# Patient Record
Sex: FEMALE | Race: WHITE | NOT HISPANIC OR LATINO | Employment: UNEMPLOYED | URBAN - METROPOLITAN AREA
[De-identification: names, ages, dates, MRNs, and addresses within clinical notes are randomized per-mention and may not be internally consistent; named-entity substitution may affect disease eponyms.]

---

## 2018-02-08 ENCOUNTER — TRANSFERRED RECORDS (OUTPATIENT)
Dept: HEALTH INFORMATION MANAGEMENT | Facility: CLINIC | Age: 23
End: 2018-02-08

## 2018-02-18 DIAGNOSIS — H53.10 SUBJECTIVE VISUAL DISTURBANCE: Primary | ICD-10-CM

## 2018-02-22 ENCOUNTER — OFFICE VISIT (OUTPATIENT)
Dept: OPHTHALMOLOGY | Facility: CLINIC | Age: 23
End: 2018-02-22
Attending: OPHTHALMOLOGY
Payer: COMMERCIAL

## 2018-02-22 DIAGNOSIS — H53.40 VISUAL FIELD DEFECT: Primary | ICD-10-CM

## 2018-02-22 DIAGNOSIS — H53.10 SUBJECTIVE VISUAL DISTURBANCE: ICD-10-CM

## 2018-02-22 PROCEDURE — 92133 CPTRZD OPH DX IMG PST SGM ON: CPT | Mod: ZF | Performed by: OPHTHALMOLOGY

## 2018-02-22 PROCEDURE — 92083 EXTENDED VISUAL FIELD XM: CPT | Mod: ZF | Performed by: OPHTHALMOLOGY

## 2018-02-22 PROCEDURE — G0463 HOSPITAL OUTPT CLINIC VISIT: HCPCS | Mod: ZF

## 2018-02-22 RX ORDER — NORGESTIMATE AND ETHINYL ESTRADIOL 0.25-0.035
1 KIT ORAL DAILY
COMMUNITY

## 2018-02-22 ASSESSMENT — CONF VISUAL FIELD
OS_NORMAL: 1
OD_NORMAL: 1

## 2018-02-22 ASSESSMENT — VISUAL ACUITY
OS_SC+: -1
OD_SC: 20/15
OS_SC: 20/15
METHOD: SNELLEN - LINEAR

## 2018-02-22 ASSESSMENT — SLIT LAMP EXAM - LIDS
COMMENTS: NORMAL
COMMENTS: NORMAL

## 2018-02-22 ASSESSMENT — CUP TO DISC RATIO
OD_RATIO: 0.3
OS_RATIO: 0.3

## 2018-02-22 ASSESSMENT — TONOMETRY
IOP_METHOD: TONOPEN
OD_IOP_MMHG: 15
OS_IOP_MMHG: 12

## 2018-02-22 ASSESSMENT — EXTERNAL EXAM - LEFT EYE: OS_EXAM: NORMAL

## 2018-02-22 ASSESSMENT — EXTERNAL EXAM - RIGHT EYE: OD_EXAM: NORMAL

## 2018-02-22 NOTE — NURSING NOTE
Chief Complaints and History of Present Illnesses   Patient presents with     Consult For     scotoma, left eye - referred by Dr. Richardson ([facility])     HPI    Affected eye(s):  Left   Symptoms:     No decreased vision   No double vision   No flashes         Do you have eye pain now?:  No      Comments:  Pt here for consult of a scotoma in the left  Pt describes this as a small Wilton-shaped blind spot in the left eye - constant in the left eye  Pt states she occasionally notes some little circular spots in the right eye but not as frequent  Pt reports her vision is very good at distance and near - no changes and wears no correction    Gail Benitez COA 7:41 AM February 22, 2018

## 2018-02-22 NOTE — LETTER
2018    Cesar Richardson MD    RE: Tiffanie Restrepo  : 1995  MRN: [id number]    Dear Dr. Richardson:    Thank you for referring your patient, Tiffanie Restrepo, to my neuro-ophthalmology clinic recently.  After a thorough neuro-ophthalmic history and examination, I came to the following conclusions:     1. Subjective visual disturbance-central scotoma left eye. Normal neuro-ophthalmologic exam today.  No evidence of optic neuropathy nor maculopathy as etiology.     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 She was referred by Dr. Richardson with the [facility] practice.  He did not find a retinal etiology and was referring to evaluate for an optic neuropathy and/or consideration of electrodiagnostic testing. Present x 1 year. States that it can fluctuate in size throughout the day.  She states that it follows with her vision, does not lag behind it. It is present approximately 60-80% time throughout the day. She is able to draw the scotoma on an amsler grid which was paracentral and temporal in location. She denies photopsias. Denies numbness, tingling, diplopia. She does have left-sided headache which last 1-2 hrs. She has them approximately once every few months. She denies previous trauma. Denies any previous viral prodrome.  She also notes two episodes of syncopal episodes, once several months ago while in the shower. Previously occurred when 18 years old. ECG both normal. No holter monitoring. No additional outpatient workup. Denies family history of autoimmune disorders.     Today her visual acuity is normal, color vision is normal, fields are full to confrontation. She does not have an afferent pupillary defect on my check. Her slit lamp exam is normal. Dilated fundus exam is significant for vitreous syneresis bilaterally. 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  Cranial nerves V1-3, 7,8,9,11, and 12 were normal bilaterally.     Octopus 30-2  "automated static perimetry testing was normal both eyes. Octopus 10-2 automated static perimetry of the left eye demonstrated nonspecific superotemporal and inferonasal defect which did not match up to the location of her subjective \"spot\" on Amsler grid testing. Spectralis OCT macula and retinal nerve fiber layer was normal, including detailed scan images of both macula.    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 I can not identify a cause; specifically, I see no indication of an optic neuropathy nor maculopathy.  It is reassuring that there has not been any significant progression in symptoms over the past 1 year except perhaps that the spot appears a little more frequently- it is not visually disabling.      If it were to progress then we could consider a multifocal electroretinogram; however, in the absence of any retinal abnormalities on spectralis OCT, I think the yield would be very low for this.  Furthermore, if there were pinpoint retinal cone dysfunction in the left eye on multifocal electroretinogram that has been subjectively stable for the past year I don't think we would pursue any additional work-up nor would there be any treatment.  Recommend observation for now.    Again, thank you for trusting me with the care of your patient.  For further exam details, please feel free to contact our office for additional records.  If you wish to contact me regarding this patient please email me at [email or web address] or give my clinic a call to arrange a phone conversation.    Sincerely,    Srinivasa Redd MD  , Neuro-Ophthalmology and Adult Strabismus  Department of Ophthalmology and Visual Neurosciences  [facility]    DX: subjective visual disturbance, paracentral scotoma of unclear etiology      "

## 2018-02-22 NOTE — MR AVS SNAPSHOT
After Visit Summary   2018    Tiffanie Restrepo    MRN: [id number]           Patient Information     Date Of Birth          1995        Visit Information        Provider Department      2018 7:30 AM Srinivasa Redd MD [facility]        Today's Diagnoses     Visual field defect - Left Eye    -  1    Subjective visual disturbance           Follow-ups after your visit        Who to contact     Please call your clinic at [phone] to:    Ask questions about your health    Make or cancel appointments    Discuss your medicines    Learn about your test results    Speak to your doctor            Additional Information About Your Visit        MyChart Information     [employer] is an electronic gateway that provides easy, online access to your medical records. With [employer], you can request a clinic appointment, read your test results, renew a prescription or communicate with your care team.     To sign up for [employer] visit the website at [email or web address]   You will be asked to enter the access code listed below, as well as some personal information. Please follow the directions to create your username and password.     Your access code is: [email or web address]  Expires: 5/10/2018  6:30 AM     Your access code will  in 90 days. If you need help or a new code, please contact your [facility] or call [phone] for assistance.        Care EveryWhere ID     This is your Care EveryWhere ID. This could be used by other organizations to access your [facility] medical records  [id number]         Blood Pressure from Last 3 Encounters:   No data found for BP    Weight from Last 3 Encounters:   No data found for Wt              We Performed the Following     Glaucoma Top OU     OCT Optic Nerve RNFL Spectralis OU (both eyes)        Primary Care Provider    None Specified       No primary provider on file.        Equal Access to Services     JELANI NICOLE :  Hadii yung rich Sotoñoali, walexda luqadaha, qaybta kaalaleksandar navarro, ramses kaydenin hayaanicola martíneznida gutierres layaronnicola shahriar. So [facility] [phone].    ATENCIÓN: Si habla antwan, tiene a herman disposición servicios gratuitos de asistencia lingüística. Llame al [phone].    We comply with applicable federal civil rights laws and [place] laws. We do not discriminate on the basis of race, color, national origin, age, disability, sex, sexual orientation, or gender identity.            Thank you!     Thank you for choosing [facility]  for your care. Our goal is always to provide you with excellent care. Hearing back from our patients is one way we can continue to improve our services. Please take a few minutes to complete the written survey that you may receive in the mail after your visit with us. Thank you!             Your Updated Medication List - Protect others around you: Learn how to safely use, store and throw away your medicines at www.disposemymeds.org.          This list is accurate as of 2/22/18  9:16 PM.  Always use your most recent med list.                   Brand Name Dispense Instructions for use Diagnosis    norgestimate-ethinyl estradiol 0.25-35 MG-MCG per tablet    ORTHO-CYCLEN, SPRINTEC     Take 1 tablet by mouth daily

## 2018-05-24 ENCOUNTER — TELEPHONE (OUTPATIENT)
Dept: INTERNAL MEDICINE | Facility: CLINIC | Age: 23
End: 2018-05-24

## 2018-05-24 NOTE — TELEPHONE ENCOUNTER
"[facility]: Nurse Triage Note  SITUATION/BACKGROUND                                                      Tiffanie Restrepo is a 22 year old female who calls with  Gradual change in vision since Dr Redd visit 2/22/2018, left subjective scotoma noted.  Plan @ that time, excerpted from Dr Redd's note:\"Subjective visual disturbance-central scotoma left eye. Normal neuro-ophthalmologic exam today.  No evidence of optic neuropathy nor maculopathy as etiology.\" plan:  \"we could consider a multifocal electroretinogram however in the absence of any retinal abnormalities on spectralis OCT I think the yield would be very low for this. \"  Description:   bilaterally sees dots iwhich states are \" not like floaters as they stay in same spot 75 % of the time, and sometimes look like a bullseye\"  Previously this was confined to \"1-2 \"dots\" now 2-3 bilaterally.    Onset/duration:  Gradual since February, wanted to report change and see what is next step.  No eye pain, nor drainage. No blood, irritation in eye and no recent injury. Does review previous head trauma x 2 of 1-5 years ago.  No field cut, light flashes or curtains in visual field.  No medications, wished to discuss changes and next step.        Allergies:   Allergies   Allergen Reactions     Cefdinir      Fluoxetine      Sulfa Drugs        ASSESSMENT      Non-acute changes in vision, will ask Ophth team to review and schedule accordingly.She understands.    RECOMMENDATION/PLAN                                                      RECOMMENDED DISPOSITION:  Phone Visit  Will comply with recommendation: Yes    If further questions/concerns or if symptoms do not improve, worsen or new symptoms develop, call your PCP or [phone] to talk with the Resident on call, as soon as possible.    Guideline used: pp 639 vision problems  Telephone Triage Protocols for Nurses, Fifth Edition, Laila Grossman RN  "

## 2018-05-24 NOTE — TELEPHONE ENCOUNTER
Worsening left eye vision-- dots are more larger in size over past 2-3 months   Starting see dots in right eye since before last eye exam-- not changing    Dots in vision stationary-- not floating    H/o one right eye floater than moves over one year     No eye pain  No redness  Eyes dry  Visual acuity unchanged    Pt noted one dot starting to effect part of words past couple weeks when reading in left eye     Will forward to dr. De

## 2018-06-26 DIAGNOSIS — H53.10 SUBJECTIVE VISUAL DISTURBANCE: Primary | ICD-10-CM

## 2018-06-27 ENCOUNTER — OFFICE VISIT (OUTPATIENT)
Dept: OPHTHALMOLOGY | Facility: CLINIC | Age: 23
End: 2018-06-27
Attending: OPHTHALMOLOGY
Payer: COMMERCIAL

## 2018-06-27 DIAGNOSIS — H53.40 VISUAL FIELD DEFECT: Primary | ICD-10-CM

## 2018-06-27 DIAGNOSIS — H53.10 SUBJECTIVE VISUAL DISTURBANCE: ICD-10-CM

## 2018-06-27 PROCEDURE — 92134 CPTRZ OPH DX IMG PST SGM RTA: CPT | Mod: ZF | Performed by: OPHTHALMOLOGY

## 2018-06-27 PROCEDURE — 92082 INTERMEDIATE VISUAL FIELD XM: CPT | Mod: ZF | Performed by: OPHTHALMOLOGY

## 2018-06-27 PROCEDURE — G0463 HOSPITAL OUTPT CLINIC VISIT: HCPCS | Mod: ZF | Performed by: TECHNICIAN/TECHNOLOGIST

## 2018-06-27 ASSESSMENT — VISUAL ACUITY
METHOD: SNELLEN - LINEAR
OS_SC: 20/20
OD_SC: 20/20

## 2018-06-27 ASSESSMENT — SLIT LAMP EXAM - LIDS
COMMENTS: NORMAL
COMMENTS: NORMAL

## 2018-06-27 ASSESSMENT — EXTERNAL EXAM - RIGHT EYE: OD_EXAM: NORMAL

## 2018-06-27 ASSESSMENT — TONOMETRY
OS_IOP_MMHG: 17
IOP_METHOD: ICARE
OD_IOP_MMHG: 15

## 2018-06-27 ASSESSMENT — CUP TO DISC RATIO
OS_RATIO: 0.3
OD_RATIO: 0.3

## 2018-06-27 ASSESSMENT — EXTERNAL EXAM - LEFT EYE: OS_EXAM: NORMAL

## 2018-06-27 NOTE — MR AVS SNAPSHOT
After Visit Summary   2018    Tiffanie Restrepo    MRN: [id number]           Patient Information     Date Of Birth          1995        Visit Information        Provider Department      2018 9:15 AM Srinivasa Redd MD [facility]        Today's Diagnoses     Visual field defect - Left Eye    -  1    Subjective visual disturbance           Follow-ups after your visit        Who to contact     Please call your clinic at [phone] to:    Ask questions about your health    Make or cancel appointments    Discuss your medicines    Learn about your test results    Speak to your doctor            Additional Information About Your Visit        MyChart Information     [employer] is an electronic gateway that provides easy, online access to your medical records. With [employer], you can request a clinic appointment, read your test results, renew a prescription or communicate with your care team.     To sign up for [employer] visit the website at [email or web address]   You will be asked to enter the access code listed below, as well as some personal information. Please follow the directions to create your username and password.     Your access code is: [email or web address]  Expires: 2018  6:30 AM     Your access code will  in 90 days. If you need help or a new code, please contact your [facility] or call [phone] for assistance.        Care EveryWhere ID     This is your Care EveryWhere ID. This could be used by other organizations to access your [facility] medical records  [id number]         Blood Pressure from Last 3 Encounters:   No data found for BP    Weight from Last 3 Encounters:   No data found for Wt              We Performed the Following     IOP Measurement     OCT Retina Spectralis OU (both eyes)     OVF 10-2 Macula OU        Primary Care Provider Fax #    Physician No Ref-Primary [phone]       No address on file        Equal Access to  Services     [facility]: Hadii aad ku hadwilfridoshania Lissettekiesha, walexda luqadaha, qaybta kaalmaearl navarro, ramses bess. So [facility] [phone].    ATENCIÓN: Si habla español, tiene a herman disposición servicios gratuitos de asistencia lingüística. Llame al [phone].    We comply with applicable federal civil rights laws and [place] laws. We do not discriminate on the basis of race, color, national origin, age, disability, sex, sexual orientation, or gender identity.            Thank you!     Thank you for choosing [facility]  for your care. Our goal is always to provide you with excellent care. Hearing back from our patients is one way we can continue to improve our services. Please take a few minutes to complete the written survey that you may receive in the mail after your visit with us. Thank you!             Your Updated Medication List - Protect others around you: Learn how to safely use, store and throw away your medicines at www.disposemymeds.org.          This list is accurate as of 6/27/18  5:39 PM.  Always use your most recent med list.                   Brand Name Dispense Instructions for use Diagnosis    norgestimate-ethinyl estradiol 0.25-35 MG-MCG per tablet    ORTHO-CYCLEN, SPRINTEC     Take 1 tablet by mouth daily

## 2018-06-27 NOTE — NURSING NOTE
"Chief Complaints and History of Present Illnesses   Patient presents with     Follow Up For     subjective visual disturbance, visual field defect     HPI    Symptoms:              Comments:  Circles/spots in left eye vision. Patient states spots that are \"gray\" that happens everyday, over half of the time per day. Patient feels some are floaters but one isn't as it move whenever she moves her eyes.     Vision stable per patient, sometimes blurry ? To [place] per patient.     MARBELLA Falcon 6/27/2018 9:52 AM                 "

## 2018-06-27 NOTE — LETTER
2018    RE: Tiffanie Restrepo  : 1995  MRN: [id number]    Dear Providers,    I saw our mutual patient, Tiffanie Restrepo, in follow-up in my clinic recently.  After a thorough neuro-ophthalmic history and examination, I came to the following conclusions:       1. Subjective visual disturbance-central scotoma left eye. Normal neuro-ophthalmologic exam today.  No evidence of optic neuropathy nor maculopathy as etiology. Observe  2. Probable bilateral vitreous floaters- no evidence of retinal tears / breaks / detachments    Last time she was seen by Dr. Redd in 2018. 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 Richardson with the [facility] practice.  He did not find a retinal etiology and was referring to evaluate for an optic neuropathy and/or consideration of electrodiagnostic testing. Present x 1 year. States that it can fluctuate in size throughout the day.  She states that it follows with her vision, does not lag behind it. She is able to draw the scotoma on an amsler grid which was paracentral and just superotemporal to fixation in location.  She has them approximately once every few months. She denies previous trauma. Denies any previous viral prodrome.      She also notes two episodes of syncopal episodes, once when in the shower several months ago while in the shower. Previously occurred when 18 years old. ECG both normal. No holter monitoring. No additional outpatient workup. Denies family history of autoimmune disorders.     Patient reports that gray spot just superior and temporal to fixation is more bothersome in the left eye now.  She noticed it more and it is larger at times.  She also has other spots that float in vision in both eyes and are semitransparent.  No associated headaches.  Denies numbness, tingling, diplopia. She does have left sided headache which last 1-2 hrs. She denies photopsias.     The patient  has normal afferent visual function in both eyes including normal best corrected visual acuity, color vision, confrontation visual fields, and pupillary light responses in both eyes. her structural eye exam including slit lamp exam and dilated fundus exam was normal in both eyes with the exception of mild vitreous syneresis in both eyes.  Cranial nerves V1-3, 7,8,9,11, and 12 were normal bilaterally.     Octopus 10-2 automated static perimetry in both eyes was full. Spectralis OCT macula and retinal nerve fiber layer was normal, including detailed scan images of both macula.    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  I don't have an explanation but I reassured the patient that there is no evidence of maculopathy, optic neuropathy, nor any significant visual field loss.    Recommend observation for now.    I did not make a follow-up appointment, but I would be happy to see the patient back in the future should any new neuro-ophthalmic concern arise.        For further exam details, please feel free to contact our office for additional records.  If you wish to contact me regarding this patient please email me at [email or web address] or give my clinic a call to arrange a phone conversation.    Sincerely,    Srinivasa Redd MD  , Neuro-Ophthalmology and Adult Strabismus  Department of Ophthalmology and Visual Neurosciences  [facility]

## 2018-06-27 NOTE — PROGRESS NOTES
1. Subjective visual disturbance-central scotoma left eye. Normal neuro-ophthalmologic exam today.  No evidence of optic neuropathy nor maculopathy as etiology. Observe  2. Probable bilateral vitreous floaters- no evidence of retinal tears / breaks / detachments    Last time she was seen by Dr. Redd in February 2018. 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 Richardson with the [facility] practice.  He did not find a retinal etiology and was referring to evaluate for an optic neuropathy and/or consideration of electrodiagnostic testing. Present x 1 year. States that it can fluctuate in size throughout the day.  She states that it follows with her vision, does not lag behind it. She is able to draw the scotoma on an amsler grid which was paracentral and just superotemporal to fixation in location.  She has them approximately once every few months. She denies previous trauma. Denies any previous viral prodrome.      She also notes two episodes of syncopal episodes, once when in the shower several months ago while in the shower. Previously occurred when 18 years old. ECG both normal. No holter monitoring. No additional outpatient workup. Denies family history of autoimmune disorders.     Patient reports that gray spot just superior and temporal to fixation is more bothersome in the left eye now.  She noticed it more and it is larger at times.  She also has other spots that float in vision in both eyes and are semitransparent.  No associated headaches.  Denies numbness, tingling, diplopia. She does have left sided headache which last 1-2 hrs. She denies photopsias.     The patient has normal afferent visual function in both eyes including normal best corrected visual acuity, color vision, confrontation visual fields, and pupillary light responses in both eyes. her structural eye exam including slit lamp exam and dilated fundus exam was normal in  both eyes with the exception of mild vitreous syneresis in both eyes.  Cranial nerves V1-3, 7,8,9,11, and 12 were normal bilaterally.     Octopus 10-2 automated static perimetry in both eyes was full. Spectralis OCT macula and retinal nerve fiber layer was normal, including detailed scan images of both macula.    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  I don't have an explanation but I reassured the patient that there is no evidence of maculopathy, optic neuropathy, nor any significant visual field loss.    Recommend observation for now.    I did not make a follow-up appointment, but I would be happy to see the patient back in the future should any new neuro-ophthalmic concern arise.         Complete documentation of historical and exam elements from today's encounter can be found in the full encounter summary report (not reduplicated in this progress note).  I personally obtained the chief complaint(s) and history of present illness.  I confirmed and edited as necessary the review of systems, past medical/surgical history, family history, social history, and examination findings as documented by others; and I examined the patient myself.  I personally reviewed the relevant tests, images, and reports as documented above.  I formulated and edited as necessary the assessment and plan and discussed the findings and management plan with the patient and family.  I personally reviewed the ophthalmic test(s) associated with this encounter, agree with the interpretation(s) as documented by the resident/fellow, and have edited the corresponding report(s) as necessary.     MD Tyler العراقيannam  Neurology resident   Pager: [phone]

## 2019-11-04 NOTE — PROGRESS NOTES
"     1. Subjective visual disturbance-central scotoma left eye. Normal neuro-ophthalmologic exam today.  No evidence of optic neuropathy nor maculopathy as etiology.     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 Richardson with the [facility] practice.  He did not find a retinal etiology and was referring to evaluate for an optic neuropathy and/or consideration of electrodiagnostic testing. Present x 1 year. States that it can fluctuate in size throughout the day.  She states that it follows with her vision, does not lag behind it. It is present approximately 60-80% time throughout the day. She is able to draw the scotoma on an amsler grid which was paracentral and temporal in location. She denies photopsias. Denies numbness, tingling, diplopia. She does have left sided headache which last 1-2 hrs. She has them approximately once every few months. She denies previous trauma. Denies any previous viral prodrome.  She also notes two episodes of syncopal episodes, once when in the shower several months ago while in the shower. Previously occurred when 18 years old. ECG both normal. No holter monitoring. No additional outpatient workup. Denies family history of autoimmune disorders.     Today her visual acuity is normal, color vision is normal, fields are full to confrontation. She does not have an afferent pupillary defect on my check. Her slit lamp exam is normal. Dilated fundus exam is significant for vitreous syneresis bilaterally. 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  Cranial nerves V1-3, 7,8,9,11, and 12 were normal bilaterally.     Octopus 30-2 automated static perimetry testing was normal both eyes. Octopus 10-2 automated static perimetry of the left eye demonstrated nonspecific superotemporal and inferonasal defect which did not match up to the location of her subjective \"spot\" on Amsler grid testing. Spectralis OCT macula " and retinal nerve fiber layer was normal, including detailed scan images of both macula.    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 I can not identify a cause- specifically I see no indication of an optic neuropathy nor maculopathy.  It is reassuring that there has not been any significant progression in symptoms over the past 1 year except perhaps that the spot appears a little more frequently- it is not visually disabling.      If it were to progress then we could consider a multifocal electroretinogram however in the absence of any retinal abnormalities on spectralis OCT I think the yield would be very low for this.  Furthermore if there were pinpoint retinal cone dysfunction in the left eye on multifocal electroretinogram that has been subjectively stable for the past year I don't think we would pursue any additional work-up nor would there be any treatment.  Recommend observation for now.       I spent a total of 60 minutes face to face with Tiffanie Restrepo during today's office visit.  Over 50% of this time was spent counseling the patient and/or coordinating care regarding her subjective visual disturbance.    Complete documentation of historical and exam elements from today's encounter can be found in the full encounter summary report (not reduplicated in this progress note).  I personally obtained the chief complaint(s) and history of present illness.  I confirmed and edited as necessary the review of systems, past medical/surgical history, family history, social history, and examination findings as documented by others; and I examined the patient myself.  I personally reviewed the relevant tests, images, and reports as documented above.  I formulated and edited as necessary the assessment and plan and discussed the findings and management plan with the patient and family.  I personally reviewed the ophthalmic test(s) associated with this encounter, agree  with the interpretation(s) as documented by the resident/fellow, and have edited the corresponding report(s) as necessary.     Srinivasa Redd MD         Statement Selected

## 2020-03-05 ENCOUNTER — APPOINTMENT (OUTPATIENT)
Dept: GENERAL RADIOLOGY | Facility: CLINIC | Age: 25
End: 2020-03-05
Attending: EMERGENCY MEDICINE
Payer: OTHER MISCELLANEOUS

## 2020-03-05 ENCOUNTER — HOSPITAL ENCOUNTER (EMERGENCY)
Facility: CLINIC | Age: 25
Discharge: HOME | End: 2020-03-05
Attending: EMERGENCY MEDICINE | Admitting: EMERGENCY MEDICINE
Payer: OTHER MISCELLANEOUS

## 2020-03-05 VITALS
OXYGEN SATURATION: 100 % | DIASTOLIC BLOOD PRESSURE: 85 MMHG | RESPIRATION RATE: 16 BRPM | HEART RATE: 74 BPM | TEMPERATURE: 97.7 F | SYSTOLIC BLOOD PRESSURE: 125 MMHG

## 2020-03-05 DIAGNOSIS — S61.211A LACERATION OF LEFT INDEX FINGER WITHOUT FOREIGN BODY WITHOUT DAMAGE TO NAIL, INITIAL ENCOUNTER: ICD-10-CM

## 2020-03-05 PROCEDURE — 12001 RPR S/N/AX/GEN/TRNK 2.5CM/<: CPT

## 2020-03-05 PROCEDURE — 99284 EMERGENCY DEPT VISIT MOD MDM: CPT

## 2020-03-05 PROCEDURE — 73140 X-RAY EXAM OF FINGER(S): CPT | Mod: LT

## 2020-03-05 ASSESSMENT — ENCOUNTER SYMPTOMS: WOUND: 1

## 2020-03-05 NOTE — ED AVS SNAPSHOT
Emergency Department  [address] [facility] [address]  Phone:  [phone]  Fax:  [phone]                                    Tiffanie Restrepo   MRN: [id number]    Department:   Emergency Department   Date of Visit:  3/5/2020           After Visit Summary Signature Page    I have received my discharge instructions, and my questions have been answered. I have discussed any challenges I see with this plan with the nurse or doctor.    ..........................................................................................................................................  Patient/Patient Representative Signature      ..........................................................................................................................................  Patient Representative Print Name and Relationship to Patient    ..................................................               ................................................  Date                                   Time    ..........................................................................................................................................  Reviewed by Signature/Title    ...................................................              ..............................................  Date                                               Time          22EPIC Rev 08/18

## 2020-03-05 NOTE — ED PROVIDER NOTES
History     Chief Complaint:  Laceration     HPI   Tiffanie Restrepo is a right handed 24 year old female who presents with laceration. The patient reports that today she was at work and she was using a saw when she accidentally cut her left pointer finger. The patient has since been experiencing slight nonradiating pain to the laceration and has taken 1000 mg of ibuprofen. She denies other injuries or difficulty moving the finger. The patient's boss drove her to the emergency department.   No numbness or weakness.    Allergies:  Duloxetine   Fluxoetine   Sulfa Drugs   Cefdinir      Medications:    norgestimate-ethinyl estradiol     Past Medical History:    Medical history reviewed. No pertinent medical history.      Past Surgical History:    adenoidectomy   Shoulder surgery  Sinus surgery  Tonsillectomy     Family History:    Father: glaucoma, cataracts     Social History:  This is a work related injury.   The patient makes frames for a living.     Review of Systems   Constitutional: Negative.    Skin: Positive for wound.     Physical Exam     Patient Vitals for the past 24 hrs:   BP Temp Temp src Pulse Resp SpO2   03/05/20 1247 134/85 97.7  F (36.5  C) Tympanic 74 16 100 %      Physical Exam   General: Nontoxic-appearing woman sitting upright in Fast[employer] 2  CV: no active bleeding, extremities well perfused, normal cap refill in all fingers of left hand  Resp: normal respiratory effort  MSK: no surrounding bony tenderness, full range of motion of all joints in left second finger  Skin: 1.5 cm slightly curved laceration to palmar surface of left second finger primarily overlying the PIP joint, no visible bone or tendon or foreign body  Neuro: alert, surrounding sensation and motor function intact  Psych: cooperative      Emergency Department Course     Imaging:  Radiology findings were communicated with the patient who voiced understanding of the findings.    Fingers XR, 2-3 views, left  Unremarkable exam.  LARISSA  MD ANISHA  Reading per radiology      Procedures:    Procedure Note: Laceration Repair   Performed by: Miguelito Mayo MD    Verbal consent given by patient who confirms understanding of the procedure being performed after discussing the risks, benefits and alternatives.    Preparation: Patient was prepped and draped in usual sterile fashion, with assistance from ERT.  Irrigation solution: saline    Body area: L 2nd finger  Laceration length: 1.5 cm  Contamination: The wound is not grossly contaminated.  Foreign bodies: none  Tendon involvement: none apparent  Anesthesia:  Digital block  Local anesthetic: Bupivacaine 0.5% with epinephrine    Debridement: none   Skin closure: Closed with 4 x 5.0 Ethilon   Technique: interrupted  Approximation: close  Approximation difficulty: moderate, due to location overlying joint    Patient tolerated the procedure well with no immediate complications.    Emergency Department Course:    1253 Nursing notes and vitals reviewed. I performed an exam of the patient as documented above.     1313 The patient was sent for a XR while in the emergency department, results above.      1350 Patient rechecked and updated.      1440 Procedure started.     1455 Prior to discharge, I personally reviewed the results with the patient and all related questions were answered. The patient verbalized understanding and is amenable to plan.     Impression & Plan      Medical Decision Making:  While there are no hard signs or symptoms suggestive of tendon injury or neurovascular compromise, I did make it clear to the patient that it is possible she sustained occult tendon injury, and if she experiences any difficulties with finger function or wound healing, then referral to an orthopedic hand specialist would be indicated, which was specifically discussed with her.  Good wound edge approximation and hemostasis was maintained with sutures provided, which should be removed in about 10 to 14 days  through clinic.  No evidence of fracture, dislocation, or any gross contamination.  Prophylactic antibiotics were not indicated.  Return for sudden worsening in any time.  An AlumaFoam splint was placed to provide soft tissue rest to minimize the risk of dehiscence given the location of this laceration over the joint.  She expressed understanding of and satisfaction with this plan, expressing familiarity with it due to a number of prior lacerations.    Diagnosis:    ICD-10-CM    1. Laceration of left index finger without foreign body without damage to nail, initial encounter S61.211A      Disposition:   The patient is discharged to home.     Discharge Medications:  No discharge medications.     Scribe Disclosure:  I, Orla Severson, am serving as a scribe at 1:10 PM on 3/5/2020 to document services personally performed by Miguelito Mayo MD based on my observations and the provider's statements to me.   EMERGENCY DEPARTMENT    This record was created at least in part using electronic voice recognition software, so please excuse any typographical errors.        Miguelito Mayo MD  03/06/20 1138

## 2020-03-06 ASSESSMENT — ENCOUNTER SYMPTOMS: CONSTITUTIONAL NEGATIVE: 1

## 2023-04-27 ENCOUNTER — TRANSFERRED RECORDS (OUTPATIENT)
Dept: HEALTH INFORMATION MANAGEMENT | Facility: CLINIC | Age: 28
End: 2023-04-27

## 2023-05-23 ENCOUNTER — TRANSFERRED RECORDS (OUTPATIENT)
Dept: HEALTH INFORMATION MANAGEMENT | Facility: CLINIC | Age: 28
End: 2023-05-23
Payer: COMMERCIAL

## 2023-05-30 ENCOUNTER — TELEPHONE (OUTPATIENT)
Dept: ORTHOPEDICS | Facility: CLINIC | Age: 28
End: 2023-05-30
Payer: COMMERCIAL

## 2023-06-10 NOTE — TELEPHONE ENCOUNTER
DIAGNOSIS: left knee pain / x-rays / Dr. Pop - [facility] /pt bringing CD with Images to appt   APPOINTMENT DATE: 6/13/23   NOTES STATUS DETAILS   OFFICE NOTE from referring provider Received  5/23/23 - Bry Pop MD - [facility]   OFFICE NOTE from other specialist Care Everywhere (scan in media tab)  5/1/23 - [facility] - Dr. Colorado    MEDICATION LIST Care Everywhere    LABS     XRAYS PACS [facility]:  04/26/2023 - Bilateral Knee   MRI PACS [facility]:  4/27/23 - MR KNEE LEFT -(Request sent to [facility])     4/27/23 - MR KNEE RIGHT - (Request sent to [facility])     *[facility]*     Records Requested     Gladys 10, 2023 3:48 PM  [place] [address]   Facility  [facility] - Bry Pop MD  Fax: [phone]   Outcome [facility] faxed urgent request to Dr. Pop for records and imaging. Fed Ex label attached for imaging.     Tracking # [id number]    UPDATE 6/12/23 1:55PM: [facility] received incoming records from Dr. Pop. Response states pt will bring CD and reports to appt with her on 6/13/23. Records scanned into Epic.      [facility] faxed urgent request to [facility] to push images     Action June 13, 2023 8:29 AM MT   Action Taken Called [facility] Film Room and TT: Mariya. Rep will push imaging STAT. *Resolved*

## 2023-06-13 ENCOUNTER — ANCILLARY PROCEDURE (OUTPATIENT)
Dept: CT IMAGING | Facility: CLINIC | Age: 28
End: 2023-06-13
Attending: ORTHOPAEDIC SURGERY
Payer: COMMERCIAL

## 2023-06-13 ENCOUNTER — OFFICE VISIT (OUTPATIENT)
Dept: ORTHOPEDICS | Facility: CLINIC | Age: 28
End: 2023-06-13
Payer: COMMERCIAL

## 2023-06-13 ENCOUNTER — PRE VISIT (OUTPATIENT)
Dept: ORTHOPEDICS | Facility: CLINIC | Age: 28
End: 2023-06-13

## 2023-06-13 ENCOUNTER — ANCILLARY PROCEDURE (OUTPATIENT)
Dept: GENERAL RADIOLOGY | Facility: CLINIC | Age: 28
End: 2023-06-13
Attending: ORTHOPAEDIC SURGERY
Payer: COMMERCIAL

## 2023-06-13 VITALS — BODY MASS INDEX: 25.05 KG/M2 | WEIGHT: 175 LBS | HEIGHT: 70 IN

## 2023-06-13 DIAGNOSIS — M25.562 PAIN IN BOTH KNEES, UNSPECIFIED CHRONICITY: ICD-10-CM

## 2023-06-13 DIAGNOSIS — M25.561 PAIN IN BOTH KNEES, UNSPECIFIED CHRONICITY: Primary | ICD-10-CM

## 2023-06-13 DIAGNOSIS — M25.562 PAIN IN BOTH KNEES, UNSPECIFIED CHRONICITY: Primary | ICD-10-CM

## 2023-06-13 DIAGNOSIS — M25.561 PAIN IN BOTH KNEES, UNSPECIFIED CHRONICITY: ICD-10-CM

## 2023-06-13 PROCEDURE — 77073 BONE LENGTH STUDIES: CPT | Performed by: SURGERY

## 2023-06-13 PROCEDURE — 99203 OFFICE O/P NEW LOW 30 MIN: CPT | Mod: GC | Performed by: ORTHOPAEDIC SURGERY

## 2023-06-13 PROCEDURE — 73700 CT LOWER EXTREMITY W/O DYE: CPT | Mod: RT | Performed by: RADIOLOGY

## 2023-06-13 ASSESSMENT — ENCOUNTER SYMPTOMS
ARTHRALGIAS: 1
STIFFNESS: 1
MUSCLE WEAKNESS: 0
NECK PAIN: 1
BACK PAIN: 1
JOINT SWELLING: 1
MUSCLE CRAMPS: 1
MYALGIAS: 0

## 2023-06-13 NOTE — LETTER
6/13/2023         RE: Tiffanie Restrepo  [address] [place] [address]        Dear Colleague,    Thank you for referring your patient, Tiffanie Restrepo, to the [facility] [place]. Please see a copy of my visit note below.        Department of Orthopedic Surgery  Talia Preciado MD        PATIENT NAME: Tiffanie Restrepo   MRN: [id number]  AGE: 27 year old  BMI: Body mass index is 25.11 kg/m .  REFERRING PHYSICIAN: Referred Self      CHIEF COMPLAINT: Consult (Bilateral knee pain since patient was [age], affecting her ability to work full time, feels unstable on her feet at work.// ref Dr. Villar)      HISTORY OF PRESENT ILLNESS:  Tiffanie Restrepo is a 27 year old female who presents with a longstanding history of bilateral anterior knee pain, subjective bilateral patellofemoral instability.  She is presenting today as a third opinion.  She was previously seen by Dr. Colorado at [facility], as well as Dr. Pop ([facility]) and Dr. Baron ([place]).  Overall, the patient's chief complaint today is bilateral anterior knee pain, her secondary complaint is bilateral patellar instability.  This has been present since she was roughly 10 years old.  She has been seen by multiple orthopedic surgeons in the past, and has been through several rounds of physical therapy.  States that she has sensation of patellofemoral pain and instability when conducting her activities of daily living, as well as when exercising.  Although the left side has historically been worse than the right, today she reports that there equivocal and how much they bother her.  She notices pain and instability when  up and ambulating, as she works as a  at a restaurant.      Notes that the patellae have never formally dislocated requiring manual reduction, however she has that sensation of lateral subluxation when going up and down stairs.  This happens bilaterally, at least on a weekly basis  over the past several years.  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  Previous physical therapy exercises have worked on strength, proprioception, range of motion.  She has conducted Dozier taping.  In particular, regarding the Dozier taping, she states that this provided her no symptomatic relief in terms of her anterior knee pain, nor her patellar instability/confidence.  Today, she presents for a third opinion, as she feels as though she has reached a point where she can no longer continue living the way that she is.  She has never had surgical interventions to the bilateral knees.  She has no other significant medical history.  Does not smoke, is otherwise active.    Pertinent negatives:  Patient has no history of DVT or PE. Discussed risk factors.      ALLERGIES: Duloxetine, Fluoxetine, Cefdinir, and Sulfa antibiotics    MEDICATIONS:     Current Outpatient Medications:     norgestimate-ethinyl estradiol (ORTHO-CYCLEN, SPRINTEC) 0.25-35 MG-MCG per tablet, Take 1 tablet by mouth daily, Disp: , Rfl:       MEDICAL HISTORY: No past medical history on file.      SURGICAL HISTORY:   Past Surgical History:   Procedure Laterality Date    ADENOIDECTOMY  04/2002    SHOULDER SURGERY Right 03/2010    SINUS SURGERY  12/2013    TONSILLECTOMY  04/2002         FAMILY HISTORY:   Family History   Problem Relation Age of Onset    Glaucoma Father     Cataracts Father     Macular Degeneration No family hx of          SOCIAL HISTORY:   Social History     Tobacco Use    Smoking status: Never    Smokeless tobacco: Never   Vaping Use    Vaping status: Not on file   Substance Use Topics    Alcohol use: Not on file         PHYSICAL EXAMINATION:  On physical examination the patient appears the stated age,  is in no acute distress, affect is appropriate, and breathing is non-labored.  BMI: Body mass index is 25.11 kg/m .    Gait: patient walks with antalgic gait.      Left Right   No deformity, skin in tact     Overall limb alignment  Neutral  Neutral   Effusion or swelling - -   Tenderness to palpation - -   ROM  0-135 0-135   Pain with knee ROM  positive, anterior knee  positive, anterior knee   Crepitance with knee ROM  positive, anterior knee  negative   Extensor lag  negative  negative   MCL stability Stable Stable   Lateral Stability Stable Stable   Lachman 1A 1A   Posterior stability Stable Stable   Pain with passive full hip range of motion none    none      Patellar translation 3 quadrants medial and 0 lateral 3 quadrants medial and  0 lateral        Apprehension  + +   J-sign  soft relocated at 10 degrees  soft relocated at 10 degrees   Prior surgical incision  none  none   Neurovascular exam Sensation intact to light touch distally in all nerve distributions;   Motor intact distally TA/GSC/EHL/FHL with 5/5 strength.  DP/PT pulses+, BCR Sensation intact to light touch distally in all nerve distributions;   Motor intact distally TA/GSC/EHL/FHL with 5/5 strength.  DP/PT pulses+, BCR           IMAGING:   X-rays of the bilateral knees were obtained today and reviewed.   The AP/lateral views demonstrate preservation of joint space at the medial, lateral and patellofemoral compartments bilaterally.  There does appear to be a rotational deformity about the bilateral knees, as evidenced by the overlap of the tibial spine and the lateral femoral condyles.  No acute bony abnormalities.    Previously obtained MRI demonstrates of the bilateral knees personally reviewed today.  This demonstrates normal-appearing cartilage at the tibiofemoral joint line, bilaterally.  Bilaterally, at the patellofemoral joint line there is lateral tilt of the patella, on the left there is associated cartilage degeneration with fissuring  over the lateral facet of the patella, with associated subchondral edema.  This is more pronounced on the left than on the right.    Ligamentous evaluation is without injury at the ACL, PCL, medial/lateral menisci, medial/lateral collaterals bilaterally.      Important Patellofemoral measurements:   CD: 1.13 right; 1.1 left  TT-T.8 right; 22 left  Patellar tilt (degrees):  28.2 degrees right, 39.8 left   PTI: 0.21 right, 0.37 left        ASSESSMENT: Tiffanie Restrepo is a 27 year old female with a greater than 10-year history of bilateral anterior knee pain, with subjective patellofemoral instability that has been refractory to conservative care modalities including bracing, physical therapy, Dozier taping.    PLAN:  At this point time, the exact etiology of Tiffanie's knee pain and subjective instability remains unclear.    Based on her clinical history alone, she has not had any mary dislocation events.  Although she does provide a history of subjective knee instability, she states that her biggest concern today is anterior knee pain as opposed to a sensation of instability.    On imaging, she does appear to have a rotational abnormality about the bilateral knees, which is most evident on her x-rays.  On MRI, her right knee appears benign as far as patellofemoral cartilage loss, however on the left side there is  fissuring over the lateral facet of the patella, with some subchondral edema present which is indicative of overload.  TT-TG values are certainly elevated, 22 on the left as compared to roughly 17 on the right.    Clinically, she does have apprehension with physical exam, and 3 quadrants of lateral shift with the bilateral patellae.  These things do lend consideration to instability as a contributing cause of anterior knee pain.    She does have a complex constellation of clinical, subjective and objective findings on physical exam and imaging.  At this point, it does appear to be pertinent to  obtain a CT version study to make sure that we have all objective data moving forward in the clinical decision making process.      Currently with her left patella showing moderate cartilage wear for 27-year-old that is based lateral and distally, it would be prudent to move the tibial tubercle slightly distal as well as anteromedial to unweight the cartilaginous damaged area.  This would be an essential tool in potential surgical treatment.  Options for her could include MPFL reconstruction with a distal lysing tibial tubercle osteotomy, possible AM Z osteotomy.      We will have her follow-up in our clinic once her CT scan has been obtained, for additional counseling and decision-making.  She was on board with this plan, all questions were addressed to the best of our abilities today.  She will follow-up Onsite with a follow-up visit in a few weeks    I have personally examined this patient and have reviewed the clinical presentation and progress note with the resident.  I agree with the treatment plan as outlined.  The plan was formulated with the resident on the day of the resident dictation.    Talia Preciado MD      ADDendum  Right femoral anteversion is 25 degrees.  Left femoral anteversion is 34 degrees.    Tibial torsion on the left is 18 degrees.  Tibial torsion on the right is 20 degrees.    Tibial Tuberosity to Trochlear groove distance:  Right: 21 mm.  Left: 21 mm.    Femoral Tibial Rotation:  Right tibia is 23 degrees rotated externally relative to femur.  Left tibia is 23 degrees rotated externally relative to femur.    Based on the above numbers, we do not believe that version is at the surgical threshold so this should not change our surgical planning.      Answers for HPI/ROS submitted by the patient on 6/13/2023  General Symptoms: No  Skin Symptoms: No  HENT Symptoms: No  EYE SYMPTOMS: No  HEART SYMPTOMS: No  LUNG SYMPTOMS: No  INTESTINAL SYMPTOMS: No  URINARY SYMPTOMS: No  GYNECOLOGIC  SYMPTOMS: No  BREAST SYMPTOMS: No  SKELETAL SYMPTOMS: Yes  BLOOD SYMPTOMS: No  NERVOUS SYSTEM SYMPTOMS: No  MENTAL HEALTH SYMPTOMS: No  Back pain: Yes  Muscle aches: No  Neck pain: Yes  Swollen joints: Yes  Joint pain: Yes  Bone pain: No  Muscle cramps: Yes  Muscle weakness: No  Joint stiffness: Yes  Bone fracture: No      Copy to : Michelet Colorado MD  [facility].

## 2023-06-13 NOTE — NURSING NOTE
"Reason For Visit:   Chief Complaint   Patient presents with     Consult     Bilateral knee pain since patient was [age], affecting her ability to work full time, feels unstable on her feet at work.// ref Dr. Villar       Primary MD: Andreina Prasad  Referring MD: Jensen Villar     ?  No  Currently working? Yes.  Work status?  Full-time, but pain interupting.  Smoker: No  Request smoking cessation information: No    Ht 1.778 m (5' 10\")   Wt 79.4 kg (175 lb)   BMI 25.11 kg/m      Pain Assessment  Patient Currently in Pain: Yes  0-10 Pain Scale: 2 (patient statesstiff and swollen, affecting her ability to work full time, currently radiating pain from knee down anterior leg)    "

## 2023-06-27 ENCOUNTER — TELEPHONE (OUTPATIENT)
Dept: ORTHOPEDICS | Facility: CLINIC | Age: 28
End: 2023-06-27
Payer: COMMERCIAL

## 2023-06-27 NOTE — TELEPHONE ENCOUNTER
[facility] Call Center    Phone Message    May a detailed message be left on voicemail: yes     Reason for Call: Other: Tiffanie called to set up an appointment to discuss surgery with Dr. Preciado but her first opneing is not until September. Tiffanie was wondering if there is any way to get in sooner to see Dr. Preciado? She also has some questions. Please call     Action Taken: Other: [facility]    Travel Screening: Not Applicable

## 2023-06-27 NOTE — TELEPHONE ENCOUNTER
Patient was called back.  Lizzette Preciado will review her CT scan on 7/11/23 and she will be called back with her recommendations and plan. She was agreeable with this plan.

## 2023-07-18 ENCOUNTER — PATIENT OUTREACH (OUTPATIENT)
Dept: CARE COORDINATION | Facility: CLINIC | Age: 28
End: 2023-07-18

## 2023-07-18 ENCOUNTER — VIRTUAL VISIT (OUTPATIENT)
Dept: ORTHOPEDICS | Facility: CLINIC | Age: 28
End: 2023-07-18
Payer: COMMERCIAL

## 2023-07-18 DIAGNOSIS — M25.562 PATELLOFEMORAL INSTABILITY OF LEFT KNEE WITH PAIN: Primary | ICD-10-CM

## 2023-07-18 DIAGNOSIS — M25.362 PATELLOFEMORAL INSTABILITY OF LEFT KNEE WITH PAIN: Primary | ICD-10-CM

## 2023-07-18 PROCEDURE — 99213 OFFICE O/P EST LOW 20 MIN: CPT | Mod: VID | Performed by: ORTHOPAEDIC SURGERY

## 2023-07-18 NOTE — LETTER
7/18/2023         RE: Tiffanie Restrepo  [address] [place] [address]        Dear Colleague,    Thank you for referring your patient, Tiffanie Restrepo, to the [facility] [place]. Please see a copy of my visit note below.    Reason For Visit:   Chief Complaint   Patient presents with    RECHECK     [employer]ia [employer] Follow up both knees review CT        Primary MD: Andreina Prasad  Referring MD: Jensen Villar                 ?  No  Currently working? Yes.  Work status?  Full-time, but pain interupting.  Date of surgery: NA  Type of surgery: NA.  Smoker: No  Request smoking cessation information: No      There were no vitals taken for this visit.    Pain Assessment  Patient Currently in Pain: Yes  0-10 Pain Scale: 2  Primary Pain Location: Knee (bilateral)    Tiffanie is a 28 year old who is being evaluated via a billable video visit.      How would you like to obtain your AVS? [employer]  If the video visit is dropped, the invitation should be resent by: Text to cell phone: [phone]  Will anyone else be joining your video visit? No        Video-Visit Details    Type of service:  Video Visit   Video Start Time: 8:54  Video End Time:9:11 AM    Originating Location (pt. Location): Home    Distant Location (provider location):  On-site  Platform used for Video Visit: cocone     Subjective  Patient video to discuss bilateral anterior knee pain and results of the CT scan for version, listed below.  These values are below surgical threshold.    MRI shows moderate cartilage wear on Left patella, early cartilage wear on the right.    Patient reports pain on both sides, but left side 'catches' andiIs more functionally disabled.    Based on history and imaging to date, I would recommend an operation to unload the lateral patellofemoral joint, and stabilize the patella.   This would be AMZ with slight distalization with an MPFL-R.    Surgery was discussed.  She is on oral BCP  and should stop it before surgery x 1 month.  No DVT risks factors, does not smile.    Wants surgery after Labor Day.  Will need a work slip.  Will be out x 3-4 mo. 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    Questions were answered, surgical timesheets and patient information sheets were sent.    Study Results       CT LOWER EXTREMITY BILATERAL W/O CONTRAST    Right femoral anteversion is 25 degrees.  Left femoral anteversion is 34 degrees.     Tibial torsion on the right is 20 degrees.  Tibial torsion on the left is 18 degrees.     Tibial Tuberosity to Trochlear groove distance:  Right: 21 mm.  Left: 21 mm.     Femoral Tibial Rotation:  Right tibia is 23 degrees rotated externally relative to femur.  Left tibia is 23 degrees rotated externally relative to femur.       Talia Preciado MD  Professor Orthopedic Surgery  [facility]    Copy to : Michelet Colorado MD,  [facility]

## 2023-07-18 NOTE — PROGRESS NOTES
Social Work - Intervention  [facility]  Data/Intervention:    Patient Name: Tiffanie Restrepo Goes By: Tiffanie    /Age: 1995 (28 year old)     Visit Type: telephone  Referral Source: Ortho JOHNNY Lopez  Reason for Referral: Insurance concerns    Collaborated With:    -JOHNNY Britton- [phone]     Psychosocial Information/Concerns:  Received notice from Jessica that Tiffanie is concerned she will lose her MA soon.      Intervention/Education/Resources Provided:  I contacted Tiffanie and left a vm introducing myself and I explained that the [facility] team can help navigate her concerns regarding her MA but noted I need her permission to refer to that team. I asked that Tiffanie call back and let me know if she's ok with that referral and if there are any other concerns I can help with.      Assessment/Plan:  I will await a return call from Tiffanie and proceed accordingly.      Provided patient/family with contact information and availability.    KAVON Dao, [facility]    [facility]  Ph: [phone], Pgr: [phone]  2023

## 2023-07-19 ENCOUNTER — TELEPHONE (OUTPATIENT)
Dept: ORTHOPEDICS | Facility: CLINIC | Age: 28
End: 2023-07-19
Payer: COMMERCIAL

## 2023-07-19 NOTE — TELEPHONE ENCOUNTER
Phoned patient to get her scheduled for surgery with Dr. Preciado.    Patient was unavailable,   Provided call back number in voicemail:   [phone] & [phone] for care team.

## 2023-07-20 ENCOUNTER — PATIENT OUTREACH (OUTPATIENT)
Dept: CARE COORDINATION | Facility: CLINIC | Age: 28
End: 2023-07-20
Payer: COMMERCIAL

## 2023-07-20 NOTE — PROGRESS NOTES
Social Work - Intervention  [facility]  Data/Intervention:    Patient Name: Tiffanie Restrepo Goes By: Tiffanie    /Age: 1995 (28 year old)     Visit Type: telephone  Referral Source: Ortho  Reason for Referral: Insurance concerns    Collaborated With:    -Tiffanie- [phone]     Psychosocial Information/Concerns:  Referral received indicating Zuhair has concerns she will lose her MA.      Intervention/Education/Resources Provided:  I was able to connect with Tiffanie today and she discussed currently being on MA and needing to watch how much she makes through her job. Tiffanie also discussed having [place] in the past and not having a deductible. Tiffanie said she is planning to have arthritis related surgery later this year and wants to know if [place] were [place] if she would have high premiums or anything, because if she would she would need to do something regarding her job now.     I offered to place an FRW referral as the FRW would know a lot more regarding the specifics of [place] and whether Tiffanie would qualify for this or MA. Tiffanie is agreeable to this. FRW referral was placed.     Tiffanie discussed frustration with surgery scheduling and how she thought surgery would be happening sooner than what she is now being told. I provided supportive listening.     Tiffanie denied having any further SW needs or questions at this time. Tiffanie knows she can contact me if this changes.      Assessment/Plan:  No further follow up is planned on my part but I will remain available if further assistance is needed.      Provided patient/family with contact information and availability.    KAVON Dao, [facility]    [facility]  Ph: [phone], Pgr: [phone]  2023

## 2023-07-20 NOTE — TELEPHONE ENCOUNTER
Received incoming call from patient wanting to schedule surgery with Dr. Preciado.     Patient stated that she was told by Dr. Preciado during her consult, that she was going to get in for surgery sometime in August or September.     Explained to patient that currently Dr. Preciado's next available at the [facility] is on 10/19/23. Patient insisted that she was told that she would be able to get in either August or September and does not want to wait until the next available.    Patient is requesting to verify with provider or care team and see if it would be appropriate to be move up to earlier date than her next available.      Caller explained that it would be pass down to care team to see if its possible.     Patient also asked if care team could possibly call her to go over the reasons why she needs to stop taking birth control and clarify other medical questions.     Will route to care team.     No further questions or concerns.   Patient will await for call back.

## 2023-07-25 ENCOUNTER — TELEPHONE (OUTPATIENT)
Dept: ORTHOPEDICS | Facility: CLINIC | Age: 28
End: 2023-07-25
Payer: COMMERCIAL

## 2023-07-25 PROBLEM — M25.362 PATELLOFEMORAL INSTABILITY OF LEFT KNEE WITH PAIN: Status: ACTIVE | Noted: 2023-07-18

## 2023-07-25 PROBLEM — M25.562 PATELLOFEMORAL INSTABILITY OF LEFT KNEE WITH PAIN: Status: ACTIVE | Noted: 2023-07-18

## 2023-07-25 NOTE — TELEPHONE ENCOUNTER
Phoned patient back.  She is wondering about her birth control.  She is going on a camping trip starting Sunday, July 30th-Aug 8th or 9th.  She is wondering when she should stop her birth control, she does not want to stop it during her camping trip.    Writer consulted with Dr. Preciado who recommended she should stop her birth control after her cycle in August, which is about 2-3 weeks before her planned procedure and continue to be off of it for 1 month post operatively.    Patient verbalized understanding and thankful for call back.    Jessica Hoang RN on 7/25/2023 at 1:54 PM

## 2023-07-25 NOTE — TELEPHONE ENCOUNTER
Follow up call per [facility] surgery scheduler patient has questions for Dr. Preciado team.    No answer during call, VM left with call back instructions    Jessica Hoang RN on 7/25/2023 at 11:50 AM

## 2023-07-25 NOTE — TELEPHONE ENCOUNTER
Patient is scheduled for surgery with Dr. Preciado    Spoke with: Tiffanie    Date of Surgery: 9/7/23    Location: ASC    Informed patient they will need an adult  Yes    H&P: Scheduled with PCP, patient will schedule with Britney in [place]    Additional imaging/appointments: POP Made    Surgery packet: Received     Additional comments: Requesting c/b from care team to go over questions and concerns.         Tamika Alba on 7/25/2023 at 9:27 AM

## 2023-08-08 ENCOUNTER — TELEPHONE (OUTPATIENT)
Dept: ORTHOPEDICS | Facility: CLINIC | Age: 28
End: 2023-08-08
Payer: COMMERCIAL

## 2023-08-08 DIAGNOSIS — M25.362 PATELLOFEMORAL INSTABILITY OF LEFT KNEE WITH PAIN: Primary | ICD-10-CM

## 2023-08-08 DIAGNOSIS — M25.562 PATELLOFEMORAL INSTABILITY OF LEFT KNEE WITH PAIN: Primary | ICD-10-CM

## 2023-08-08 NOTE — TELEPHONE ENCOUNTER
Attempted to phone patient to review pre-op teaching for upcoming Left Knee Arthroscopy, Medial Patello-femoral Ligament Reconstruction with Allograft, carlitos-medial plus distal Tibial Tubercle Osteotomy, lateral retinacular lengthening with partial lateral facetectomy DOS 9/7/23 with Dr. Preciado.    No answer during call, VM left with call back instructions.    Post op PT entered today.    CPM ordered through [employer].    Jessica Hoang RN on 8/8/2023 at 2:14 PM

## 2023-08-10 ENCOUNTER — ANESTHESIA EVENT (OUTPATIENT)
Dept: SURGERY | Facility: AMBULATORY SURGERY CENTER | Age: 28
End: 2023-08-10
Payer: COMMERCIAL

## 2023-08-10 ENCOUNTER — OFFICE VISIT (OUTPATIENT)
Dept: SURGERY | Facility: CLINIC | Age: 28
End: 2023-08-10
Payer: COMMERCIAL

## 2023-08-10 ENCOUNTER — PRE VISIT (OUTPATIENT)
Dept: SURGERY | Facility: CLINIC | Age: 28
End: 2023-08-10

## 2023-08-10 ENCOUNTER — LAB (OUTPATIENT)
Dept: LAB | Facility: CLINIC | Age: 28
End: 2023-08-10
Payer: COMMERCIAL

## 2023-08-10 VITALS
RESPIRATION RATE: 16 BRPM | HEIGHT: 70 IN | OXYGEN SATURATION: 97 % | WEIGHT: 194 LBS | SYSTOLIC BLOOD PRESSURE: 122 MMHG | TEMPERATURE: 98.2 F | HEART RATE: 71 BPM | BODY MASS INDEX: 27.77 KG/M2 | DIASTOLIC BLOOD PRESSURE: 84 MMHG

## 2023-08-10 DIAGNOSIS — M25.362 PATELLOFEMORAL INSTABILITY OF LEFT KNEE WITH PAIN: ICD-10-CM

## 2023-08-10 DIAGNOSIS — Z01.818 PREOP EXAMINATION: Primary | ICD-10-CM

## 2023-08-10 DIAGNOSIS — M25.562 PATELLOFEMORAL INSTABILITY OF LEFT KNEE WITH PAIN: ICD-10-CM

## 2023-08-10 DIAGNOSIS — Z01.818 PREOP EXAMINATION: ICD-10-CM

## 2023-08-10 LAB
ANION GAP SERPL CALCULATED.3IONS-SCNC: 11 MMOL/L (ref 7–15)
BASOPHILS # BLD AUTO: 0 10E3/UL (ref 0–0.2)
BASOPHILS NFR BLD AUTO: 0 %
BUN SERPL-MCNC: 13.6 MG/DL (ref 6–20)
CALCIUM SERPL-MCNC: 9.7 MG/DL (ref 8.6–10)
CHLORIDE SERPL-SCNC: 104 MMOL/L (ref 98–107)
CREAT SERPL-MCNC: 0.8 MG/DL (ref 0.51–0.95)
DEPRECATED HCO3 PLAS-SCNC: 25 MMOL/L (ref 22–29)
EOSINOPHIL # BLD AUTO: 0.1 10E3/UL (ref 0–0.7)
EOSINOPHIL NFR BLD AUTO: 1 %
ERYTHROCYTE [DISTWIDTH] IN BLOOD BY AUTOMATED COUNT: 11.9 % (ref 10–15)
GFR SERPL CREATININE-BSD FRML MDRD: >90 ML/MIN/1.73M2
GLUCOSE SERPL-MCNC: 101 MG/DL (ref 70–99)
HCT VFR BLD AUTO: 44.2 % (ref 35–47)
HGB BLD-MCNC: 14.9 G/DL (ref 11.7–15.7)
IMM GRANULOCYTES # BLD: 0 10E3/UL
IMM GRANULOCYTES NFR BLD: 0 %
LYMPHOCYTES # BLD AUTO: 3.2 10E3/UL (ref 0.8–5.3)
LYMPHOCYTES NFR BLD AUTO: 42 %
MCH RBC QN AUTO: 30.2 PG (ref 26.5–33)
MCHC RBC AUTO-ENTMCNC: 33.7 G/DL (ref 31.5–36.5)
MCV RBC AUTO: 90 FL (ref 78–100)
MONOCYTES # BLD AUTO: 0.6 10E3/UL (ref 0–1.3)
MONOCYTES NFR BLD AUTO: 8 %
NEUTROPHILS # BLD AUTO: 3.7 10E3/UL (ref 1.6–8.3)
NEUTROPHILS NFR BLD AUTO: 49 %
NRBC # BLD AUTO: 0 10E3/UL
NRBC BLD AUTO-RTO: 0 /100
PLATELET # BLD AUTO: 307 10E3/UL (ref 150–450)
POTASSIUM SERPL-SCNC: 3.9 MMOL/L (ref 3.4–5.3)
RBC # BLD AUTO: 4.93 10E6/UL (ref 3.8–5.2)
SODIUM SERPL-SCNC: 140 MMOL/L (ref 136–145)
WBC # BLD AUTO: 7.6 10E3/UL (ref 4–11)

## 2023-08-10 PROCEDURE — 85025 COMPLETE CBC W/AUTO DIFF WBC: CPT | Performed by: PATHOLOGY

## 2023-08-10 PROCEDURE — 36415 COLL VENOUS BLD VENIPUNCTURE: CPT | Performed by: PATHOLOGY

## 2023-08-10 PROCEDURE — 80048 BASIC METABOLIC PNL TOTAL CA: CPT | Performed by: PATHOLOGY

## 2023-08-10 PROCEDURE — 99204 OFFICE O/P NEW MOD 45 MIN: CPT | Performed by: PHYSICIAN ASSISTANT

## 2023-08-10 RX ORDER — NORETHINDRONE ACETATE AND ETHINYL ESTRADIOL 1MG-20(21)
1 KIT ORAL DAILY
COMMUNITY

## 2023-08-10 RX ORDER — APREPITANT 40 MG/1
40 CAPSULE ORAL ONCE
Status: CANCELLED | OUTPATIENT
Start: 2023-08-10 | End: 2023-08-10

## 2023-08-10 ASSESSMENT — ENCOUNTER SYMPTOMS: SEIZURES: 0

## 2023-08-10 ASSESSMENT — LIFESTYLE VARIABLES: TOBACCO_USE: 0

## 2023-08-10 ASSESSMENT — PAIN SCALES - GENERAL: PAINLEVEL: NO PAIN (0)

## 2023-08-10 NOTE — TELEPHONE ENCOUNTER
FUTURE VISIT INFORMATION      SURGERY INFORMATION:  Date: 9/7/23  Location:  or  Surgeon:  Talia Preciado MD   Anesthesia Type:  choice  Procedure: Left Knee Arthroscopy, Medial Patello-femoral Ligament Reconstruction with Allograft, carlitos-medial plus distal Tibial Tubercle Osteotomy, lateral retinacular lengthening with partial lateral facetectomy   Consult: virtual visit 7/18  RECORDS REQUESTED FROM:       Primary Care Provider: Dexter Marie MD  - Britney

## 2023-08-10 NOTE — PATIENT INSTRUCTIONS
Preparing for Your Surgery      Name:  Tiffanie Restrepo   MRN:  [id number]   :  1995   Today's Date:  8/10/2023         Arriving for surgery:  Surgery date:  23  Arrival time:  8:55AM    Restrictions due to COVID 19:    Please maintain social distance.  Masking is optional.      parking is available for anyone with mobility limitations or disabilities. (Monday- Friday 7 am- 5 pm)    Please come to:    [facility]  [address] [place] [id number]    Please check in on the 5th floor at the Ambulatory Surgery Center.      What can I eat or drink?    -  You may eat and drink normally until 8 hours prior to arrival  time. (Until 23, 12:55AM)  -  You may have clear liquids until 2 hours prior to arrival  time. (Until 23, 6:55AM)    Examples of clear liquids:  Water  Clear broth  Juices (apple, white grape, white cranberry  and cider) without pulp  Noncarbonated, powder based beverages  (lemonade and Saad-Aid)  Sodas (Sprite, 7-Up, ginger ale and seltzer)  Coffee or tea (without milk or cream)  Gatorade      Which medicines can I take?    Hold Aspirin for 7 days before surgery.   Hold Multivitamins for 7 days before surgery.  Hold Supplements for 7 days before surgery.  Hold Ibuprofen (Advil, Motrin) for 1 day before surgery--unless otherwise directed by surgeon.  Hold Naproxen (Aleve) for 4 days before surgery.    No alcohol or cannabis products for 24 hours prior to procedure.      -  PLEASE TAKE the following medications the day of surgery:     LoEstrin    How do I prepare myself?  - Please take 2 showers (one the night prior to surgery and one the morning of surgery) using Scrubcare or Hibiclens soap.    Use this soap only from the neck to your toes.     Leave the soap on your skin for one minute--then rinse thoroughly.      You may use your own shampoo and conditioner. No other hair products.   - Please remove all jewelry and body piercings.  - No lotions,  deodorants or fragrance.  - No makeup or fingernail polish.   - Bring your ID and insurance card.    -If you have a Deep Brain Stimulator, a Spinal Cord Stimulator, or any implanted Neuro Device, you must bring the remote to the Surgery Center.         ALL PATIENTS ARE REQUIRED TO HAVE A RESPONSIBLE ADULT TO DRIVE AND BE IN ATTENDANCE WITH THEM FOR 24 HOURS FOLLOWING SURGERY.     Covid testing policy as of 12/06/2022  Your surgeon will notify and schedule you for a COVID test if one is needed before surgery--please direct any questions or COVID symptoms to your surgeon      Questions or Concerns:    -For questions regarding the day of surgery, please contact the Ambulatory Surgery Center at [phone].    -If you have health changes between today and your surgery, please contact your surgeon.     - For questions after surgery, please contact your surgeon's office.

## 2023-08-10 NOTE — H&P
Pre-Operative H & P     CC:  Preoperative exam to assess for increased cardiopulmonary risk while undergoing surgery and anesthesia.    Date of Encounter: 8/10/2023  Primary Care Physician:  Andreina Prasad     Reason for visit:   Encounter Diagnoses   Name Primary?    Patellofemoral instability of left knee with pain Yes    Preop examination        HPI  Tiffanie Restrepo is a 28 year old female who presents for pre-operative H & P in preparation for  Procedure Information       Case: [id number] Date/Time: 09/07/23 1025    Procedure: Left Knee Arthroscopy, Medial Patello-femoral Ligament Reconstruction with Allograft, carlitos-medial plus distal Tibial Tubercle Osteotomy, lateral retinacular lengthening with partial lateral facetectomy (Left: Knee)    Anesthesia type: Choice    Diagnosis: Patellofemoral instability of left knee with pain [M25.362, M25.562]    Pre-op diagnosis: Patellofemoral instability of left knee with pain [M25.362, M25.562]    Location: [place] [address] / [facility]    Providers: Talia Preciado MD            Patient is being evaluated for comorbid conditions of dysthymic disorder, anxiety, panic disorder, insomnia.    Ms. Restrepo has a history of bilateral anterior knee pain. MRI shows moderate cartilage wear on Left patella, early cartilage wear on the right. She now presents for the above procedure.      History is obtained from the patient and chart review    Hx of abnormal bleeding or anti-platelet use: denies    Menstrual history: No LMP recorded (lmp unknown). (Menstrual status: Birth Control).:       Past Medical History  Past Medical History:   Diagnosis Date    Dysthymic disorder     Generalized anxiety disorder with panic attacks     Insomnia     Patellofemoral instability of left knee with pain 2023       Past Surgical History  Past Surgical History:   Procedure Laterality Date    ADENOIDECTOMY  04/2002    FOOT SURGERY Left     SHOULDER  SURGERY Right 03/2010    SINUS SURGERY  12/2013    TONSILLECTOMY  04/2002    w/ sinus surgery       Prior to Admission Medications  Current Outpatient Medications   Medication Sig Dispense Refill    norgestimate-ethinyl estradiol (ORTHO-CYCLEN, SPRINTEC) 0.25-35 MG-MCG per tablet Take 1 tablet by mouth daily         Allergies  Allergies   Allergen Reactions    Duloxetine Other (See Comments)     bad sweats.     Fluoxetine Other (See Comments)     flu like symptoms, low BP, couldn't play sports.     Cefdinir Itching and Rash    Sulfa Antibiotics Hives     Other reaction(s): Hives       Social History  Social History     Socioeconomic History    Marital status: Single     Spouse name: Not on file    Number of children: Not on file    Years of education: Not on file    Highest education level: Not on file   Occupational History    Not on file   Tobacco Use    Smoking status: Never    Smokeless tobacco: Never   Substance and Sexual Activity    Alcohol use: Yes     Alcohol/week: 1.0 standard drink of alcohol     Types: 1 Standard drinks or equivalent per week     Comment: 1-2 drinks every 2 weeks    Drug use: Yes     Types: Marijuana     Comment: edibles fo sleep    Sexual activity: Not on file   Other Topics Concern    Not on file   Social History Narrative    Not on file     Social Determinants of Health     Financial Resource Strain: Not on file   Food Insecurity: Not on file   Transportation Needs: Not on file   Physical Activity: Not on file   Stress: Not on file   Social Connections: Not on file   Intimate Partner Violence: Not on file   Housing Stability: Not on file       Family History  Family History   Problem Relation Age of Onset    Glaucoma Father     Cataracts Father     Macular Degeneration No family hx of     Anesthesia Reaction No family hx of     Deep Vein Thrombosis (DVT) No family hx of        Review of Systems  The complete review of systems is negative other than noted in the HPI or here.  "    Anesthesia Evaluation   Pt has had prior anesthetic.     History of anesthetic complications  - PONV.      ROS/MED HX  ENT/Pulmonary:  - neg pulmonary ROS  (-) tobacco use, asthma and sleep apnea   Neurologic:  - neg neurologic ROS  (-) no seizures and no CVA   Cardiovascular:       METS/Exercise Tolerance: >4 METS    Hematologic:    (-) history of blood clots and history of blood transfusion   Musculoskeletal: Comment: Patellar instability        GI/Hepatic:  - neg GI/hepatic ROS  (-) GERD and liver disease   Renal/Genitourinary:  - neg Renal ROS  (-) renal disease   Endo:  - neg endo ROS  (-) Type II DM   Psychiatric/Substance Use: Comment: Panic disorder      (+) psychiatric history anxiety       Infectious Disease:  - neg infectious disease ROS     Malignancy:  - neg malignancy ROS     Other:  - neg other ROS          /84 (BP Location: Right arm, Patient Position: Sitting, Cuff Size: Adult Large)   Pulse 71   Temp 98.2  F (36.8  C) (Oral)   Resp 16   Ht 1.778 m (5' 10\")   Wt 88 kg (194 lb)   LMP  (LMP Unknown)   SpO2 97%   Breastfeeding No   BMI 27.84 kg/m      Physical Exam  Constitutional: Awake, alert, cooperative, no apparent distress, and appears stated age.  Eyes: Pupils equal, round and reactive to light, extra ocular muscles intact, sclera clear, conjunctiva normal.  HENT: Normocephalic, oral pharynx with moist mucus membranes, good dentition. No goiter appreciated. No removable dental hardware.  Respiratory: Clear to auscultation bilaterally, no crackles or wheezing. No SOB when supine.  Cardiovascular: Regular rate and rhythm, normal S1 and S2, and no murmur noted.  Carotids +2, no bruits. No edema. Palpable pulses to radial, DP and PT arteries.   GI: Normal bowel sounds, soft, non-distended, non-tender, no masses palpated.    Lymph/Hematologic: No cervical lymphadenopathy and no supraclavicular lymphadenopathy.  Genitourinary:  deferred  Skin: Warm and dry.  No rashes. "   Musculoskeletal: Full ROM of neck. There is no redness, warmth, or swelling of the joints. Gross motor strength is normal.    Neurologic: Awake, alert, oriented to name, place and time. Cranial nerves II-XII are grossly intact. Gait is normal. Ambulates from chair to exam table, seats self, lies supine and sits back up w/o assistance.  Neuropsychiatric: Calm, cooperative. Normal affect. Pleasant. Answers questions appropriately, follows commands w/o difficulty.        PRIOR LABS/DIAGNOSTIC STUDIES:    All labs and imaging personally reviewed        The patient's records and results personally reviewed by this provider.       LAB/DIAGNOSTIC STUDIES TODAY:  BMP, CBC    Sodium 136 - 145 mmol/L 140     Potassium 3.4 - 5.3 mmol/L 3.9    Chloride 98 - 107 mmol/L 104    Carbon Dioxide (CO2) 22 - 29 mmol/L 25    Anion Gap 7 - 15 mmol/L 11    Urea Nitrogen 6.0 - 20.0 mg/dL 13.6    Creatinine 0.51 - 0.95 mg/dL 0.80    Calcium 8.6 - 10.0 mg/dL 9.7    Glucose 70 - 99 mg/dL 101 High     GFR Estimate >60 mL/min/1.73m2 >90         WBC Count 4.0 - 11.0 10e3/uL 7.6     RBC Count 3.80 - 5.20 10e6/uL 4.93    Hemoglobin 11.7 - 15.7 g/dL 14.9    Hematocrit 35.0 - 47.0 % 44.2    MCV 78 - 100 fL 90    MCH 26.5 - 33.0 pg 30.2    MCHC 31.5 - 36.5 g/dL 33.7    RDW 10.0 - 15.0 % 11.9    Platelet Count 150 - 450 10e3/uL 307    % Neutrophils % 49    % Lymphocytes % 42    % Monocytes % 8    % Eosinophils % 1    % Basophils % 0    % Immature Granulocytes % 0    NRBCs per 100 WBC <1 /100 0    Absolute Neutrophils 1.6 - 8.3 10e3/uL 3.7    Absolute Lymphocytes 0.8 - 5.3 10e3/uL 3.2    Absolute Monocytes 0.0 - 1.3 10e3/uL 0.6    Absolute Eosinophils 0.0 - 0.7 10e3/uL 0.1    Absolute Basophils 0.0 - 0.2 10e3/uL 0.0    Absolute Immature Granulocytes <=0.4 10e3/uL 0.0    Absolute NRBCs 10e3/uL 0.0       Assessment    Tiffanie Restrepo is a 28 year old female seen as a PAC referral for risk assessment and optimization for  "anesthesia.    Plan/Recommendations  Pt will be optimized for the proposed procedure.  See below for details on the assessment, risk, and preoperative recommendations    NEUROLOGY  - No history of TIA, CVA or seizure    -Post Op delirium risk factors:  No risk identified    ENT  - No current airway concerns.  Will need to be reassessed day of surgery.  Mallampati: I  TM: > 3    CARDIAC  - No history of CAD, Hypertension, and Afib  - METS (Metabolic Equivalents)  Patient performs 4 or more METS exercise without symptoms            Total Score: 0      RCRI-Very low risk: Class 1 0.4% complication rate            Total Score: 0        PULMONARY  JORGE Low Risk            Total Score: 0      - Denies asthma or inhaler use  - Tobacco History    History   Smoking Status    Never   Smokeless Tobacco    Never       GI  - Denies GERD  PONV High Risk  Total Score: 4           1 AN PONV: Pt is Female    1 AN PONV: Patient is not a current smoker    1 AN PONV: Patient has history of PONV    1 AN PONV: Intended Post Op Opioids      Aprepitant ordered for DOS        ENDOCRINE    - BMI: Estimated body mass index is 27.84 kg/m  as calculated from the following:    Height as of this encounter: 1.778 m (5' 10\").    Weight as of this encounter: 88 kg (194 lb).  Healthy Weight (BMI 18.5-24.9)  - No history of Diabetes Mellitus    HEME  VTE Low Risk 0.26%            Total Score: 0      - No history of abnormal bleeding or antiplatelet use.      MSK  - Patellar instability    PSYCH  - Anxiety, h/o panic attacks. May benefit from anxiolytic in preop      The patient is aware that the final anesthesia plan will be decided by the assigned anesthesia provider on the date of service.      The patient is optimized for their procedure. AVS with information on surgery time/arrival time, meds and NPO status given by nursing staff. No further diagnostic testing indicated.      On the day of service:     Prep time: 12 minutes  Visit time: 23 " minutes  Documentation time: 11 minutes  ------------------------------------------  Total time: 46 minutes      Catie Sweeney PA-C  Preoperative Assessment Center  [facility]  [facility] and [facility] Center  Phone: [phone]  Fax: [phone]

## 2023-08-15 ENCOUNTER — MEDICAL CORRESPONDENCE (OUTPATIENT)
Dept: HEALTH INFORMATION MANAGEMENT | Facility: CLINIC | Age: 28
End: 2023-08-15
Payer: COMMERCIAL

## 2023-08-15 ENCOUNTER — NURSE TRIAGE (OUTPATIENT)
Dept: NURSING | Facility: CLINIC | Age: 28
End: 2023-08-15

## 2023-08-15 ENCOUNTER — TELEPHONE (OUTPATIENT)
Dept: ORTHOPEDICS | Facility: CLINIC | Age: 28
End: 2023-08-15
Payer: COMMERCIAL

## 2023-08-15 NOTE — TELEPHONE ENCOUNTER
Attempted to phone patient per her PAC provider she has questions for Dr. Preciado's team.  No answer during call, left  with call back info    Jessica Hoang, RN on 8/15/2023 at 4:05 PM

## 2023-08-15 NOTE — TELEPHONE ENCOUNTER
Pt somehow got sent to red flag triage ..States she has no urgent medical concerns She was returning a call to [place] (Dr Lazar nurse)    Forwarding to ortho with message

## 2023-08-16 NOTE — TELEPHONE ENCOUNTER
2nd attempt to phone patient back. No answer during call, VM left with call back info.    Jessica Hoang RN on 8/16/2023 at 8:34 AM

## 2023-08-22 ENCOUNTER — TELEPHONE (OUTPATIENT)
Dept: ORTHOPEDICS | Facility: CLINIC | Age: 28
End: 2023-08-22
Payer: COMMERCIAL

## 2023-08-22 RX ORDER — CEFAZOLIN SODIUM 2 G/50ML
2 SOLUTION INTRAVENOUS
Status: CANCELLED | OUTPATIENT
Start: 2023-09-07

## 2023-08-22 RX ORDER — CEFAZOLIN SODIUM 2 G/50ML
2 SOLUTION INTRAVENOUS SEE ADMIN INSTRUCTIONS
Status: CANCELLED | OUTPATIENT
Start: 2023-09-07

## 2023-08-22 NOTE — TELEPHONE ENCOUNTER
[facility] Call Center    Phone Message    May a detailed message be left on voicemail: yes     Reason for Call: Other: Patient is requesting a temporary handicap sticker. Call back at [phone]. Patient also has questions about surgery with [facility] insurance coverage.        Action Taken: Message routed to:  [facility] ([facility]): [phone]    Travel Screening: Not Applicable                                                                    Reason for admission: J tube is clogged  Admitted from: ED  Report received from:  Lauren rosenberg 2 RN skin assessment completed by:michael      - Findings (add LDA if needed): with J tube at lower abdomen and g tube at LLQ.  Bed algorithm reevaluated:   Was Pulsate ordered?:no  Care plan (primary problem) and education initiated: yes  Detailed Belongings: clothes, black bag, medical supplies, personal hygienes stuffs, foods, keys, pump, cords, wallet, cash (pt. Refused to count)

## 2023-08-29 ENCOUNTER — TELEPHONE (OUTPATIENT)
Dept: ORTHOPEDICS | Facility: CLINIC | Age: 28
End: 2023-08-29
Payer: COMMERCIAL

## 2023-08-29 NOTE — TELEPHONE ENCOUNTER
A [employer] message was placed to the patient and pre-op teaching was performed over the phone.    Teaching Flowsheet   Relevant Diagnosis: Pre-Op Teaching  Teaching Topic:      Person(s) involved in teaching:   Patient     Motivation Level:  Asks Questions: Yes  Eager to Learn: Yes  Cooperative: Yes  Receptive (willing/able to accept information): Yes  Any cultural factors/Church beliefs that may influence understanding or compliance? No     Patient demonstrates understanding of the following:  Reason for the appointment, diagnosis and treatment plan: Yes  Knowledge of proper use of medications and conditions for which they are ordered (with special attention to potential side effects or drug interactions): Yes  Which situations necessitate calling provider and whom to contact: Yes- discussed the stoplight tool to help assist with this.      Teaching Concerns Addressed:      Proper use of surgical scrub explain: Yes    Nutritional needs and diet plan: Yes  Pain management techniques: Yes  Wound Care: Yes  How and/when to access community resources: Yes     Instructional Materials Used/Given:  2 bottle of chlorhexidine and a surgery packet given to patient in clinic.      - Important contact info/ phone numbers: emphasizing clinic number and after hours number  - Map/ location of surgery  - Medications to avoid  - Showering instructions  - Stop light tool    Additionally the following was discussed with patient:  - Mother will be driving the patient to surgery and staying with them for 24 hours.      -Next step: Surgery date: 9/7 and pre op done    Time spent with patient: 15 minutes.

## 2023-09-07 ENCOUNTER — HOSPITAL ENCOUNTER (OUTPATIENT)
Facility: AMBULATORY SURGERY CENTER | Age: 28
Discharge: HOME | End: 2023-09-07
Attending: ORTHOPAEDIC SURGERY
Payer: COMMERCIAL

## 2023-09-07 ENCOUNTER — ANESTHESIA (OUTPATIENT)
Dept: SURGERY | Facility: AMBULATORY SURGERY CENTER | Age: 28
End: 2023-09-07
Payer: COMMERCIAL

## 2023-09-07 ENCOUNTER — ANCILLARY PROCEDURE (OUTPATIENT)
Dept: RADIOLOGY | Facility: AMBULATORY SURGERY CENTER | Age: 28
End: 2023-09-07
Attending: ORTHOPAEDIC SURGERY
Payer: COMMERCIAL

## 2023-09-07 VITALS
DIASTOLIC BLOOD PRESSURE: 62 MMHG | HEIGHT: 70 IN | HEART RATE: 52 BPM | RESPIRATION RATE: 16 BRPM | BODY MASS INDEX: 27.77 KG/M2 | SYSTOLIC BLOOD PRESSURE: 105 MMHG | OXYGEN SATURATION: 98 % | WEIGHT: 194 LBS | TEMPERATURE: 97.8 F

## 2023-09-07 DIAGNOSIS — M25.562 LEFT KNEE PAIN: ICD-10-CM

## 2023-09-07 DIAGNOSIS — Z98.890 S/P KNEE SURGERY: Primary | ICD-10-CM

## 2023-09-07 LAB
HCG UR QL: NEGATIVE
INTERNAL QC OK POCT: NORMAL
POCT KIT EXPIRATION DATE: NORMAL
POCT KIT LOT NUMBER: NORMAL

## 2023-09-07 PROCEDURE — 81025 URINE PREGNANCY TEST: CPT | Performed by: PATHOLOGY

## 2023-09-07 PROCEDURE — 27420 REVISION OF UNSTABLE KNEECAP: CPT | Mod: LT

## 2023-09-07 PROCEDURE — 27427 RECONSTRUCTION KNEE: CPT | Mod: LT

## 2023-09-07 PROCEDURE — C1762 CONN TISS, HUMAN(INC FASCIA): HCPCS

## 2023-09-07 DEVICE — IMPLANTABLE DEVICE: Type: IMPLANTABLE DEVICE | Site: KNEE | Status: FUNCTIONAL

## 2023-09-07 DEVICE — IMP SCR SYN CORTEX 3.5X50MM SELF TAP SS 204.850: Type: IMPLANTABLE DEVICE | Site: KNEE | Status: FUNCTIONAL

## 2023-09-07 RX ORDER — KETAMINE HYDROCHLORIDE 10 MG/ML
INJECTION INTRAMUSCULAR; INTRAVENOUS PRN
Status: DISCONTINUED | OUTPATIENT
Start: 2023-09-07 | End: 2023-09-07

## 2023-09-07 RX ORDER — SODIUM CHLORIDE, SODIUM LACTATE, POTASSIUM CHLORIDE, CALCIUM CHLORIDE 600; 310; 30; 20 MG/100ML; MG/100ML; MG/100ML; MG/100ML
INJECTION, SOLUTION INTRAVENOUS CONTINUOUS PRN
Status: DISCONTINUED | OUTPATIENT
Start: 2023-09-07 | End: 2023-09-07

## 2023-09-07 RX ORDER — BUPIVACAINE HYDROCHLORIDE AND EPINEPHRINE 5; 5 MG/ML; UG/ML
INJECTION, SOLUTION PERINEURAL
Status: COMPLETED | OUTPATIENT
Start: 2023-09-07 | End: 2023-09-07

## 2023-09-07 RX ORDER — LIDOCAINE HYDROCHLORIDE 20 MG/ML
INJECTION, SOLUTION INFILTRATION; PERINEURAL PRN
Status: DISCONTINUED | OUTPATIENT
Start: 2023-09-07 | End: 2023-09-07

## 2023-09-07 RX ORDER — SODIUM CHLORIDE, SODIUM LACTATE, POTASSIUM CHLORIDE, CALCIUM CHLORIDE 600; 310; 30; 20 MG/100ML; MG/100ML; MG/100ML; MG/100ML
INJECTION, SOLUTION INTRAVENOUS CONTINUOUS
Status: DISCONTINUED | OUTPATIENT
Start: 2023-09-07 | End: 2023-09-07 | Stop reason: HOSPADM

## 2023-09-07 RX ORDER — CLINDAMYCIN PHOSPHATE 900 MG/50ML
900 INJECTION, SOLUTION INTRAVENOUS SEE ADMIN INSTRUCTIONS
Status: DISCONTINUED | OUTPATIENT
Start: 2023-09-07 | End: 2023-09-07

## 2023-09-07 RX ORDER — CLINDAMYCIN PHOSPHATE 900 MG/50ML
900 INJECTION, SOLUTION INTRAVENOUS
Status: DISCONTINUED | OUTPATIENT
Start: 2023-09-07 | End: 2023-09-07

## 2023-09-07 RX ORDER — CEFAZOLIN SODIUM 2 G/100ML
2 INJECTION, SOLUTION INTRAVENOUS EVERY 8 HOURS
Status: DISCONTINUED | OUTPATIENT
Start: 2023-09-07 | End: 2023-09-09 | Stop reason: HOSPADM

## 2023-09-07 RX ORDER — FENTANYL CITRATE 50 UG/ML
25-50 INJECTION, SOLUTION INTRAMUSCULAR; INTRAVENOUS
Status: DISCONTINUED | OUTPATIENT
Start: 2023-09-07 | End: 2023-09-07 | Stop reason: HOSPADM

## 2023-09-07 RX ORDER — NALOXONE HYDROCHLORIDE 0.4 MG/ML
0.2 INJECTION, SOLUTION INTRAMUSCULAR; INTRAVENOUS; SUBCUTANEOUS
Status: DISCONTINUED | OUTPATIENT
Start: 2023-09-07 | End: 2023-09-07 | Stop reason: HOSPADM

## 2023-09-07 RX ORDER — HYDROMORPHONE HYDROCHLORIDE 1 MG/ML
0.4 INJECTION, SOLUTION INTRAMUSCULAR; INTRAVENOUS; SUBCUTANEOUS EVERY 5 MIN PRN
Status: DISCONTINUED | OUTPATIENT
Start: 2023-09-07 | End: 2023-09-07 | Stop reason: HOSPADM

## 2023-09-07 RX ORDER — FLUMAZENIL 0.1 MG/ML
0.2 INJECTION, SOLUTION INTRAVENOUS
Status: DISCONTINUED | OUTPATIENT
Start: 2023-09-07 | End: 2023-09-07 | Stop reason: HOSPADM

## 2023-09-07 RX ORDER — AMOXICILLIN 250 MG
1-2 CAPSULE ORAL 2 TIMES DAILY
Qty: 30 TABLET | Refills: 0 | Status: SHIPPED | OUTPATIENT
Start: 2023-09-07

## 2023-09-07 RX ORDER — ONDANSETRON 4 MG/1
4 TABLET, ORALLY DISINTEGRATING ORAL EVERY 30 MIN PRN
Status: DISCONTINUED | OUTPATIENT
Start: 2023-09-07 | End: 2023-09-07 | Stop reason: HOSPADM

## 2023-09-07 RX ORDER — ACETAMINOPHEN 325 MG/1
650 TABLET ORAL EVERY 4 HOURS PRN
Qty: 50 TABLET | Refills: 0 | Status: SHIPPED | OUTPATIENT
Start: 2023-09-07

## 2023-09-07 RX ORDER — GLYCOPYRROLATE 0.2 MG/ML
INJECTION, SOLUTION INTRAMUSCULAR; INTRAVENOUS PRN
Status: DISCONTINUED | OUTPATIENT
Start: 2023-09-07 | End: 2023-09-07

## 2023-09-07 RX ORDER — ONDANSETRON 2 MG/ML
4 INJECTION INTRAMUSCULAR; INTRAVENOUS EVERY 30 MIN PRN
Status: DISCONTINUED | OUTPATIENT
Start: 2023-09-07 | End: 2023-09-07 | Stop reason: HOSPADM

## 2023-09-07 RX ORDER — ONDANSETRON 4 MG/1
4 TABLET, ORALLY DISINTEGRATING ORAL EVERY 30 MIN PRN
Status: DISCONTINUED | OUTPATIENT
Start: 2023-09-07 | End: 2023-09-09 | Stop reason: HOSPADM

## 2023-09-07 RX ORDER — DEXAMETHASONE SODIUM PHOSPHATE 4 MG/ML
INJECTION, SOLUTION INTRA-ARTICULAR; INTRALESIONAL; INTRAMUSCULAR; INTRAVENOUS; SOFT TISSUE PRN
Status: DISCONTINUED | OUTPATIENT
Start: 2023-09-07 | End: 2023-09-07

## 2023-09-07 RX ORDER — ASPIRIN 81 MG/1
81 TABLET ORAL 2 TIMES DAILY WITH MEALS
Qty: 56 TABLET | Refills: 0 | Status: SHIPPED | OUTPATIENT
Start: 2023-09-07 | End: 2023-10-05

## 2023-09-07 RX ORDER — LORAZEPAM 0.5 MG/1
1 TABLET ORAL EVERY 4 HOURS PRN
Status: DISCONTINUED | OUTPATIENT
Start: 2023-09-07 | End: 2023-09-09 | Stop reason: HOSPADM

## 2023-09-07 RX ORDER — FENTANYL CITRATE 50 UG/ML
INJECTION, SOLUTION INTRAMUSCULAR; INTRAVENOUS PRN
Status: DISCONTINUED | OUTPATIENT
Start: 2023-09-07 | End: 2023-09-07

## 2023-09-07 RX ORDER — ACETAMINOPHEN 325 MG/1
650 TABLET ORAL
Status: DISCONTINUED | OUTPATIENT
Start: 2023-09-07 | End: 2023-09-09 | Stop reason: HOSPADM

## 2023-09-07 RX ORDER — ACETAMINOPHEN 325 MG/1
975 TABLET ORAL ONCE
Status: DISCONTINUED | OUTPATIENT
Start: 2023-09-07 | End: 2023-09-07 | Stop reason: HOSPADM

## 2023-09-07 RX ORDER — ACETAMINOPHEN 325 MG/1
975 TABLET ORAL ONCE
Status: COMPLETED | OUTPATIENT
Start: 2023-09-07 | End: 2023-09-07

## 2023-09-07 RX ORDER — LIDOCAINE 40 MG/G
CREAM TOPICAL
Status: DISCONTINUED | OUTPATIENT
Start: 2023-09-07 | End: 2023-09-07 | Stop reason: HOSPADM

## 2023-09-07 RX ORDER — FENTANYL CITRATE 50 UG/ML
50 INJECTION, SOLUTION INTRAMUSCULAR; INTRAVENOUS EVERY 5 MIN PRN
Status: DISCONTINUED | OUTPATIENT
Start: 2023-09-07 | End: 2023-09-07 | Stop reason: HOSPADM

## 2023-09-07 RX ORDER — ONDANSETRON 2 MG/ML
INJECTION INTRAMUSCULAR; INTRAVENOUS PRN
Status: DISCONTINUED | OUTPATIENT
Start: 2023-09-07 | End: 2023-09-07

## 2023-09-07 RX ORDER — PROPOFOL 10 MG/ML
INJECTION, EMULSION INTRAVENOUS PRN
Status: DISCONTINUED | OUTPATIENT
Start: 2023-09-07 | End: 2023-09-07

## 2023-09-07 RX ORDER — ACETAMINOPHEN 325 MG/1
975 TABLET ORAL ONCE
Status: DISCONTINUED | OUTPATIENT
Start: 2023-09-07 | End: 2023-09-09 | Stop reason: HOSPADM

## 2023-09-07 RX ORDER — NALOXONE HYDROCHLORIDE 0.4 MG/ML
0.4 INJECTION, SOLUTION INTRAMUSCULAR; INTRAVENOUS; SUBCUTANEOUS
Status: DISCONTINUED | OUTPATIENT
Start: 2023-09-07 | End: 2023-09-07 | Stop reason: HOSPADM

## 2023-09-07 RX ORDER — HYDROMORPHONE HYDROCHLORIDE 1 MG/ML
0.2 INJECTION, SOLUTION INTRAMUSCULAR; INTRAVENOUS; SUBCUTANEOUS EVERY 5 MIN PRN
Status: DISCONTINUED | OUTPATIENT
Start: 2023-09-07 | End: 2023-09-07 | Stop reason: HOSPADM

## 2023-09-07 RX ORDER — CEFAZOLIN SODIUM 1 G/3ML
INJECTION, POWDER, FOR SOLUTION INTRAMUSCULAR; INTRAVENOUS PRN
Status: DISCONTINUED | OUTPATIENT
Start: 2023-09-07 | End: 2023-09-07

## 2023-09-07 RX ORDER — KETOROLAC TROMETHAMINE 30 MG/ML
INJECTION, SOLUTION INTRAMUSCULAR; INTRAVENOUS PRN
Status: DISCONTINUED | OUTPATIENT
Start: 2023-09-07 | End: 2023-09-07

## 2023-09-07 RX ORDER — FENTANYL CITRATE 50 UG/ML
25 INJECTION, SOLUTION INTRAMUSCULAR; INTRAVENOUS EVERY 5 MIN PRN
Status: DISCONTINUED | OUTPATIENT
Start: 2023-09-07 | End: 2023-09-07 | Stop reason: HOSPADM

## 2023-09-07 RX ORDER — OXYCODONE HYDROCHLORIDE 5 MG/1
5-10 TABLET ORAL EVERY 4 HOURS PRN
Qty: 26 TABLET | Refills: 0 | Status: SHIPPED | OUTPATIENT
Start: 2023-09-07 | End: 2023-09-11

## 2023-09-07 RX ORDER — DIAZEPAM 5 MG
5 TABLET ORAL ONCE
Status: COMPLETED | OUTPATIENT
Start: 2023-09-07 | End: 2023-09-07

## 2023-09-07 RX ORDER — OXYCODONE HYDROCHLORIDE 5 MG/1
10 TABLET ORAL
Status: DISCONTINUED | OUTPATIENT
Start: 2023-09-07 | End: 2023-09-09 | Stop reason: HOSPADM

## 2023-09-07 RX ORDER — APREPITANT 40 MG/1
40 CAPSULE ORAL ONCE
Status: COMPLETED | OUTPATIENT
Start: 2023-09-07 | End: 2023-09-07

## 2023-09-07 RX ORDER — ONDANSETRON 4 MG/1
4 TABLET, ORALLY DISINTEGRATING ORAL EVERY 8 HOURS PRN
Qty: 4 TABLET | Refills: 0 | Status: SHIPPED | OUTPATIENT
Start: 2023-09-07

## 2023-09-07 RX ORDER — BUPIVACAINE HYDROCHLORIDE 2.5 MG/ML
INJECTION, SOLUTION EPIDURAL; INFILTRATION; INTRACAUDAL PRN
Status: DISCONTINUED | OUTPATIENT
Start: 2023-09-07 | End: 2023-09-07 | Stop reason: HOSPADM

## 2023-09-07 RX ORDER — OXYCODONE HYDROCHLORIDE 5 MG/1
5 TABLET ORAL
Status: COMPLETED | OUTPATIENT
Start: 2023-09-07 | End: 2023-09-07

## 2023-09-07 RX ORDER — PROPOFOL 10 MG/ML
INJECTION, EMULSION INTRAVENOUS CONTINUOUS PRN
Status: DISCONTINUED | OUTPATIENT
Start: 2023-09-07 | End: 2023-09-07

## 2023-09-07 RX ORDER — ONDANSETRON 2 MG/ML
4 INJECTION INTRAMUSCULAR; INTRAVENOUS EVERY 30 MIN PRN
Status: DISCONTINUED | OUTPATIENT
Start: 2023-09-07 | End: 2023-09-09 | Stop reason: HOSPADM

## 2023-09-07 RX ADMIN — FENTANYL CITRATE 50 MCG: 50 INJECTION, SOLUTION INTRAMUSCULAR; INTRAVENOUS at 10:42

## 2023-09-07 RX ADMIN — KETAMINE HYDROCHLORIDE 10 MG: 10 INJECTION INTRAMUSCULAR; INTRAVENOUS at 11:46

## 2023-09-07 RX ADMIN — KETOROLAC TROMETHAMINE 30 MG: 30 INJECTION, SOLUTION INTRAMUSCULAR; INTRAVENOUS at 11:55

## 2023-09-07 RX ADMIN — PROPOFOL 175 MCG/KG/MIN: 10 INJECTION, EMULSION INTRAVENOUS at 09:47

## 2023-09-07 RX ADMIN — SODIUM CHLORIDE, SODIUM LACTATE, POTASSIUM CHLORIDE, CALCIUM CHLORIDE: 600; 310; 30; 20 INJECTION, SOLUTION INTRAVENOUS at 12:31

## 2023-09-07 RX ADMIN — SODIUM CHLORIDE, SODIUM LACTATE, POTASSIUM CHLORIDE, CALCIUM CHLORIDE: 600; 310; 30; 20 INJECTION, SOLUTION INTRAVENOUS at 09:42

## 2023-09-07 RX ADMIN — Medication 0.5 MG: at 12:34

## 2023-09-07 RX ADMIN — GLYCOPYRROLATE 0.2 MG: 0.2 INJECTION, SOLUTION INTRAMUSCULAR; INTRAVENOUS at 09:50

## 2023-09-07 RX ADMIN — CEFAZOLIN SODIUM 2 G: 1 INJECTION, POWDER, FOR SOLUTION INTRAMUSCULAR; INTRAVENOUS at 09:50

## 2023-09-07 RX ADMIN — LIDOCAINE HYDROCHLORIDE 100 MG: 20 INJECTION, SOLUTION INFILTRATION; PERINEURAL at 09:47

## 2023-09-07 RX ADMIN — KETAMINE HYDROCHLORIDE 10 MG: 10 INJECTION INTRAMUSCULAR; INTRAVENOUS at 11:30

## 2023-09-07 RX ADMIN — FENTANYL CITRATE 50 MCG: 50 INJECTION, SOLUTION INTRAMUSCULAR; INTRAVENOUS at 13:49

## 2023-09-07 RX ADMIN — APREPITANT 40 MG: 40 CAPSULE ORAL at 09:21

## 2023-09-07 RX ADMIN — FENTANYL CITRATE 50 MCG: 50 INJECTION, SOLUTION INTRAMUSCULAR; INTRAVENOUS at 13:44

## 2023-09-07 RX ADMIN — Medication 0.3 MG: at 12:11

## 2023-09-07 RX ADMIN — OXYCODONE HYDROCHLORIDE 5 MG: 5 TABLET ORAL at 13:50

## 2023-09-07 RX ADMIN — DEXAMETHASONE SODIUM PHOSPHATE 4 MG: 4 INJECTION, SOLUTION INTRA-ARTICULAR; INTRALESIONAL; INTRAMUSCULAR; INTRAVENOUS; SOFT TISSUE at 09:50

## 2023-09-07 RX ADMIN — KETAMINE HYDROCHLORIDE 20 MG: 10 INJECTION INTRAMUSCULAR; INTRAVENOUS at 10:40

## 2023-09-07 RX ADMIN — FENTANYL CITRATE 50 MCG: 50 INJECTION, SOLUTION INTRAMUSCULAR; INTRAVENOUS at 09:47

## 2023-09-07 RX ADMIN — PROPOFOL 175 MCG/KG/MIN: 10 INJECTION, EMULSION INTRAVENOUS at 09:51

## 2023-09-07 RX ADMIN — Medication 0.2 MG: at 12:03

## 2023-09-07 RX ADMIN — PROPOFOL 300 MG: 10 INJECTION, EMULSION INTRAVENOUS at 09:47

## 2023-09-07 RX ADMIN — ONDANSETRON 4 MG: 4 TABLET, ORALLY DISINTEGRATING ORAL at 15:02

## 2023-09-07 RX ADMIN — DIAZEPAM 5 MG: 5 TABLET ORAL at 16:23

## 2023-09-07 RX ADMIN — BUPIVACAINE HYDROCHLORIDE AND EPINEPHRINE 10 ML: 5; 5 INJECTION, SOLUTION PERINEURAL at 09:53

## 2023-09-07 RX ADMIN — ACETAMINOPHEN 975 MG: 325 TABLET ORAL at 09:21

## 2023-09-07 RX ADMIN — ONDANSETRON 4 MG: 2 INJECTION INTRAMUSCULAR; INTRAVENOUS at 11:55

## 2023-09-07 NOTE — ANESTHESIA PREPROCEDURE EVALUATION
Anesthesia Pre-Procedure Evaluation    Patient: Tiffanie Restrepo   MRN: [id number] : 1995        Procedure : Procedure(s):  Left Knee Arthroscopy, Medial Patello-femoral Ligament Reconstruction with Allograft, carlitos-medial plus distal Tibial Tubercle Osteotomy, lateral retinacular lengthening with partial lateral facetectomy          Past Medical History:   Diagnosis Date    Dysthymic disorder     Generalized anxiety disorder with panic attacks     Insomnia     Patellofemoral instability of left knee with pain       Past Surgical History:   Procedure Laterality Date    ADENOIDECTOMY  2002    FOOT SURGERY Left     SHOULDER SURGERY Right 2010    SINUS SURGERY  2013    TONSILLECTOMY  2002    w/ sinus surgery      Allergies   Allergen Reactions    Duloxetine Other (See Comments)     bad sweats.     Fluoxetine Other (See Comments)     flu like symptoms, low BP, couldn't play sports.     Cefdinir Itching and Rash    Sulfa Antibiotics Hives     Other reaction(s): Hives      Social History     Tobacco Use    Smoking status: Never    Smokeless tobacco: Never   Substance Use Topics    Alcohol use: Yes     Alcohol/week: 1.0 standard drink of alcohol     Types: 1 Standard drinks or equivalent per week     Comment: 1-2 drinks every 2 weeks      Wt Readings from Last 1 Encounters:   23 88 kg (194 lb)        Anesthesia Evaluation   Pt has had prior anesthetic.     No history of anesthetic complications       ROS/MED HX  ENT/Pulmonary:  - neg pulmonary ROS     Neurologic:  - neg neurologic ROS     Cardiovascular:  - neg cardiovascular ROS     METS/Exercise Tolerance: >4 METS    Hematologic:  - neg hematologic  ROS     Musculoskeletal:  - neg musculoskeletal ROS     GI/Hepatic:  - neg GI/hepatic ROS     Renal/Genitourinary:  - neg Renal ROS     Endo:  - neg endo ROS     Psychiatric/Substance Use:  - neg psychiatric ROS     Infectious Disease:  - neg infectious disease ROS     Malignancy:  - neg  malignancy ROS     Other:  - neg other ROS          Physical Exam    Airway        Mallampati: I   TM distance: > 3 FB   Neck ROM: full   Mouth opening: > 3 cm    Respiratory Devices and Support         Dental       (+) Completely normal teeth      Cardiovascular          Rhythm and rate: regular and normal     Pulmonary           breath sounds clear to auscultation           OUTSIDE LABS:  CBC:   Lab Results   Component Value Date    WBC 7.6 08/10/2023    HGB 14.9 08/10/2023    HCT 44.2 08/10/2023     08/10/2023     BMP:   Lab Results   Component Value Date     08/10/2023    POTASSIUM 3.9 08/10/2023    CHLORIDE 104 08/10/2023    CO2 25 08/10/2023    BUN 13.6 08/10/2023    CR 0.80 08/10/2023     (H) 08/10/2023     COAGS: No results found for: PTT, INR, FIBR  POC: No results found for: BGM, HCG, HCGS  HEPATIC: No results found for: ALBUMIN, PROTTOTAL, ALT, AST, GGT, ALKPHOS, BILITOTAL, BILIDIRECT, CRUZITO  OTHER:   Lab Results   Component Value Date    RESHMA 9.7 08/10/2023       Anesthesia Plan    ASA Status:  1       Anesthesia Type: General (with adductor canal block).              Consents    Anesthesia Plan(s) and associated risks, benefits, and realistic alternatives discussed. Questions answered and patient/representative(s) expressed understanding.     - Discussed:     - Discussed with:  Patient            Postoperative Care            Comments:                Lukas Banerjee MD

## 2023-09-07 NOTE — OP NOTE
PREOPERATIVE DIAGNOSES:   1. Left knee acute on chronic patellar dislocations.   2.  (+) Lateral Tightness  3. High quad vector associated with lateral patellofemoral wear.    POSTOPERATIVE DIAGNOSES:   1. Left knee acute on chronic patellar dislocations.   2.  (+) Lateral Tightness  3. High quad vector associated with lateral patellofemoral wear.  4. Grade 2-3 cartilage wear mainly deep fissuring at the median ridge and that area just lateral to ridge, inferior patella only.  5. Satisfactory tibiofemoral joint.    OPERATIONS:   1. Left knee exam under anesthesia.   2. Diagnostic arthroscopy.   3. MPFL reconstruction using gracilis allograft.   4. Lateral retinacular lengthening.   5. Thomas-medial tibial tubercle transfer (8 mm medial)  OPERATORS:   1. Talia Preciado MD   2. Rae Robert MD   ANESTHESIA: General endotracheal anesthesia supplemented with adductor nerve block.   EXAM UNDER ANESTHESIA: Range of motion 0 - 138 degrees of motion  The patient had 4+  quadrant lateral mobility, 2 quadrant medial mobility, Negative medial patellar tilt test.   . CD: 1.13 right; 1.1 left  TT-T.8 right; 22 left  Patellar tilt (degrees):  39.8   PTI: 0.37 left  Arthroscopic findings revealed no fluid upon entry into the joint.   The patient's medial and lateral compartment showed pristine cartilage surfaces, normal meniscus and satisfactory ACL.   DESCRIPTION OF PROCEDURE: Under General endotracheal anesthesia , the patient was positioned supine on the operating room table. The patient's leg was prepped and draped in usual sterile fashion. A pause was performed identifying the correct leg, the administration of antibiotics and the inclusion of the lead apron over the patient for fluoroscopic unit.   A diagnostic arthroscopy was performed using anterolateral and anteromedial portals for instrumentation and visualization respectively. Diagnostic arthroscopy findings as stated above.   At the end of this portion  of the procedure, excess fluid was removed from the knee. A tourniquet which was in place on the upper aspect of the patient's leg was elevated to 250 mmHg after Esmarch exsanguination of the leg.    We began the procedure by making an incision along the lateral aspect of the patella, approximately the length of the patella. We identified the iliotibial band and  from its insertion on the patella. We were then able to identify a second layer of the lateral patellofemoral ligament. This layer was then incised  Posteriorly, leaving the superificial layer attached to the patella, and the deep layer attached to the IM septum.  We left this open until closure.    We  made a second incision along the superior medial aspect of the patella. We identified the proximal location of the MPFL origin on the patella. We drilled a K-wire medial to lateral across the patella exiting on the lateral side of the patella. We had the pin exit between the 2 lateral layers, previously identified and marked.  This position was found with the use of a fluoroscopy. We then overdrilled this with a  4.0-mm cannulated reamer for a length of 10 mm. We placed a leader suture across the patella for later use.  Distally we made an incision over the tibial tubercle region, in line with the more proximal 'MPFL' incision,  and extended it approximately 5 mm distal. We then isolated the tibial tubercle region with its patellar tendon insertion for a distance of 5 mm distal. Using a combination of an oscillating saw and osteotomes, we removed the tibial tubercle for a length of 5 mm. .  We rasped both ends of the bone where the saw cut into cortical bone. We moved the tibial ostetomized bone segment distally to reach the tibial bone, and medially to effect a tubercle-sulcus angle of zero.  We then secured the tibial tubercle region in place with tw 3.5 cortical screws, bicortically placed in compression.   We then returned back to the proximal  portion of the knee.  We made an incision over the adductor tubercle region and identified the adductor mariela tendon with its insertion on the adductor tubercle.   An allograft was then brought into the room and fashioned to fit into a 4mm tunnel. We placed leader sutures on both ends of the graft.   We then brought the graft onto the operating table.  With leader sutures across the patella, we threaded the graft into the docking station on the medial border of the patella. We secured the graft in 2 ways. The sutures exited the lateral border of the patella were tied into the soft tissues in that region. As the graft exited the medial border of the patella, we sewed the graft into the medial retinacular soft tissue with a box stitch of #1 Vicryl.   We then placed the graft underneath the retinaculum, counterclockwise around the adductor mariela tendon, back underneath the medial retinaculum, exiting at the mid portion of the patella.   We placed the knee at 40 degrees of flexion. We removed all excessive slack in the graft. Where the 2 arms of the graft crossed over one another, just distal to the adductor mariela tendon, we placed a suture of #1 Ethibond.   We then brought the knee through a full range of motion.  We felt we had good construct with full motion on the table and 2 quadrants passive lateral mobility with a good checkrein.   We then secured the second arm of the graft into the medial patella with a soft tissue technique.   We then replaced the knee through a full range of motion and checked passive patellar mobility.    On the lateral side, we bent the knee to 60 degrees. We sewed the near end of layer 1 to the far end of layer 2 with a small imbricating suture of #1 Vicryl to see a gap of approximately 15 mm.   The adductor fascia was then closed with figure-of-eight sutures of #1 Vicryl. The incision in the medial retinaculum was closed with an imbricating suture of #1 Vicryl.  All incisions were then  closed with 2-0 Vicryl on subcutaneous tissue and a running 3-0 Monocryl. Dermabond 120wound closure system was used to seal the skin.   A sterile dressing and a Tubigrip stockinette was put in place.  Tourniquet was let down prior to closure. Total tourniquet time was 120 minutes.  The patient's knee was then placed in a locked hinge brace locked at 10 degrees of flexion. The patient will be maintained partial weightbearing on crutches. She will use a CPM postop 10-90 as tolerated.   MI RIDDLE MD     9/7/2023  Tiffanie Restrepo  MRN# [id number]  YOB: 1995

## 2023-09-07 NOTE — DISCHARGE INSTRUCTIONS
TIBIAL TUBERCLE OSTEOTOMY POST OPERATIVE DISCHARGE INSTRUCTIONS    FOLLOW UP APPOINTMENT  You are scheduled for a post operative wound check with Dr. Preciado's clinic approximately two weeks after surgery. At approximately eight weeks after surgery, you will see Dr. Preciado in clinic.       Physical therapy:   A referral for physical therapy will be made at discharge. You will also receive a physical therapy protocol in your after visit summary. This document must be taken to your first therapy visit.      ACTIVITY  Weight bearing status:   You are allowed partial weight bearing status (less than or equal to 50% body weight) on your operative leg. The hinged knee brace should be on and locked at 10 degrees. You may  tandem for brief periods during the first three weeks. Use assistive devices (crutches) as needed.    Continuous passive motion (CPM) machine:   Perform CPM exercises for six to eight hours per day for the first four weeks after surgery. The CPM should be set at 10 degrees to flexion tolerance with a goal of 90 degrees. Advance the CPM settings aggressively in increments of 5 degrees every 30 minutes to achieve desired goal. After four weeks, the CPM machine can be returned.    Hinged knee brace:  The brace should be set at 10 degrees to 90 degrees. It is to be locked at 10 degrees at all times except with CPM or ROM exercises. The brace is to be worn for three to six weeks following surgery. Sleep with the brace on until directed.    Exercises:   Perform the following exercises at least three times per day for the first four weeks after surgery to prevent complications, such as blood clots in your legs:  1) Point and flex your feet  2) Move your ankle around in big circles  3) Wiggle your toes   Also, perform thigh muscle tightening exercises for 10 to 15 minutes at least three times per day for the first four weeks after surgery.    Athletic Activities:  Activities such as swimming, bicycling,  jogging, running, and stop-and-go sports should be avoided until permitted by your provider.      COMFORT AND PAIN MANAGEMENT  Elevation:   During times of inactivity throughout the first two weeks after surgery, make an effort to decrease swelling by elevating your operative extremity. This is most effectively done by lying down and placing several pillows lengthwise under your thigh and calf to raise your toes above the level of your nose. To ensure that your knee remains in full extension, do not place pillows directly under your knee.     Icing:  An ice pack will be provided to control swelling and discomfort after surgery. Place a thin towel on your skin and apply the ice pack overtop. You may apply ice for 20 minutes every hour.     Pain Medications:  -You will be discharged with acetaminophen (Tylenol) and a narcotic medication for pain management after surgery. Acetaminophen is most effective when it is taken consistently. You may safely use acetaminophen as prescribed for the first four weeks after surgery provided you do not exceed the maximum daily dose (3,000mg daily). T  -The narcotic pain medication should only be taken on an as-needed basis, reserved for severe pain uncontrolled with scheduled acetaminophen. Attempt to wean from the narcotics as able, typically after the first 3 days. First, decrease the dose (number of tablets) and second, increase the interval (time in between doses).       ANTICOAGULATION  Depending on your risk factors, your provider may prescribe aspirin to prevent blood clots. If prescribed, take aspirin daily for the first four weeks after surgery.      WOUND CARE AND BATHING:  Wound care:  A compressive sleeve was placed on your surgical extremity to control swelling. Remove at night while sleeping for at least 8 hours.  Continue use of the compressive device until your first office visit.  A sterile dressing was placed over your surgical incision. This dressing should be kept  in place for the first ten days after surgery. Your surgical incision was closed with Exofin (a surgical adhesive).   Please contact Dr. Preciado's office if you notice the followin) significant cloudy, bloody, or malodorous drainage from the incision  2) excessive warmth and redness around the incision.    Bathing:  You may shower starting two days after surgery. Remove compression sleeve. You can shower over bandage, and after ten days, remove bandage and shower directly over incision.   No submerging/soaking the incision in water. Typically, patients are allowed to return to these activities six weeks after surgery.      CONTACTING YOUR PHYSICIAN:  You may experience symptoms that require follow-up before your scheduled two week appointment. Please contact Dr. Preciado's office if you experience:  1) Pain in your knee that persists or worsens in the first few days after surgery  2) Excessive redness or drainage of cloudy or bloody material from the wounds (clear red tinted fluid and some mild drainage should be expected) or drainage of any kind five days after surgery  3) A temperature elevation greater than 101.5 F   4) Pain, swelling or redness in your calf  5) Numbness or weakness in your leg or foot           Physical Therapy Post-Operative Guidelines  Tibial Tubercle Osteotomy (Distal/Distal Medial Translation)    Phase I: 0-4 Weeks   Precautions: Flexion ROM limited to 90? KF x 2 weeks; No OKC quad strengthening (to avoid pull at osteotomy site through patellar tendon); Observe for wound healing   Weight Bearing Brace ROM    PWB (?50% BW)   May  tandem for brief periods   Use bilateral axillary crutches or other appropriate assistive device for proper weight bearing  On & locked at 10? KF at all times except w/CPM or P/AAROM exercises   CPM optional   Discontinue brace for sleep at 4 weeks per comfort level (unless otherwise instructed by MD)   Open when seated  Emphasize full extension   Progress  flexion to ?90?KF multiple times per day   CPM per MD instruction    Patellar and peripatellar joint and soft tissue mobilizations permitted   Seated heel slide into TKE permitted maintaining contact with foot on floor   Therapeutic Exercise and Activity    Establish high quality quad set         *Superior translation of the patella         *Avoid co-contraction with hamstrings and proximal gluteal musculature         *Utilize NMES as needed   SLR x 4: NO added weight at distal limb         *Flexion: begin in standing à reclined standing à supine                -Progress per quad control, no extensor lag, NO pain at osteotomy site         *Abduction, Adduction, Extension   Beginner mat exercises for abdominal/lumbopelvic control and proximal hip strength   Gentle double legged partial squats to 30? KF max, with support or light leg press with double limb   TKE with resistance band in CKC   Goals: Control effusion and pain; ROM 0-90?; Attain a strong quad set; SLR w/NO lag; Able to perform ?30 reps prior to fatigue w/leg lifting                   Phase II: 4-8 Weeks   Precautions: RETURN TO FULL WEIGHT BEARING DICTATED BY MD CLEARANCE AND RADIOGRAPHIC HEALING; No OKC quad through large arc of motion - observe for pain and/or increased pain or swelling at osteotomy site - contact MD if present; No isolated pushing through flexed knee until MD radiographic clearance (i.e. stair climbing or step drills); Avoid end range quad stretching x 8 weeks   Weight Bearing Brace ROM    4-6 weeks - up to 75% weight bearing   6-8 weeks - transition to full weight bearing (see precaution)   WBAT indoor with brace per MD clearance     On with gait - gradually open per quad control   Lock outdoors if not confident with surroundings or w/fatigue  Full extension   Progress flexion toward full ROM - may initiate stationary bike for ROM - minimal resistance   Therapeutic Exercise and Activity (Phase II continued)    Initiate bridging with  legs over exercise ball/bolster - plank poses per comfort   Increase repetitions w/proximal hip strength and abdominals    Initiate basic 2 legged CKC strength drills         *Shallow (0-45?) KF angles for  PFJ stress   Initiate 2 legged L/E proprio/balance   Emphasize terminal knee extension control in CKC   Goals: Effusion resolving; No pain at osteotomy site; Full extension ROM; Flexion ROM ?120?; Multi-planar L/E hip strength = MMT grade 5/5   NOTE: Distal transfer of tubercle may need extended time to heal.  Pain with weight bearing at osteotomy site dictates slower progression. MD will modify per initial radiograph.       Phase III: 8-12 Weeks   Precautions: Caution w/extended periods of walking in FWB (per MD ok & symptoms); Maintain effusion/pain control with WB and HEP progression; Avoid pivoting on a planted foot; Instruct proper knee/hip alignment with CKC drills; Observe for quadriceps control of terminal knee extension with CKC drills and ADL mobility   Weight Bearing Brace ROM    FWB, unless instructed differently by MD         *Normalize gait pattern,            avoiding knee hyperextension            in early stance   Return to normal stair climbing (if healing confirmed)  Open per quad control   Protective use when out of home: environmental hazards, crowds   Transition to knee sleeve, per quad control, at week 8   Full, symmetrical ROM   Therapeutic Exercise and Activity    Progress core activities - bridging w/ or w/o ball, basic 2 legged prone plank and hip strength   Initiate basic low impact cardio with bike, elliptical, walking (15-20 minutes, minimal intensity, steady pace)   Progress CKC drills - step, lunge, leg press         *Deeper KF angles (>45?) with 2 legged support         *Early KF angles (0-45?) with 1 legged support per control/tolerance   Progress L/E proprio/balance drills: single limb per control/tolerance   Goals: Effusion resolved; No pain at osteotomy site; ROM WNL;  Progressing toward normal gait pattern in FWB; Able to perform ?30 reps prior to fatigue with leg lifting; Normal LE kinematics w/2 legged CKC activities               Phase IV: 12-16 Weeks*   Precautions: Increased pain at osteotomy site indicates need to reduce level of physical activity (fitness, ADL activity, rehab progression); Observe/instruct proper L/E alignment w/CKC drills (avoid functional valgus); Avoid pivoting on a planted foot   Cardiovascular Fitness Proprioception/Balance Core Stability    Progress low impact cardio per symptoms - increase one variable at a time (intensity level, intervals, duration)         *15-20 min minimal            intensity, steady pace to            begin  Progress drills: Add surface challenge/perturbation on DL   Single limb activities on level surface   Directional reaching and stepping drills  Advance progression of core stabilization and bridging as tolerated   Strengthening    Increase workload with CKC drills:         *Add resistance with 2 legged squatting          *Progress depth with single limb (step, lunge, leg press)         *Initiate large muscle group weight training (HS curls, leg press, calf raises, dead lift, etc.)   Goals: Restore normal mechanics with single leg CKC activities; Gait speed and distance normalizing; Able to perform 2 legged squat ?60? x 20 reps w/kinematic & symptom control; Able to maintain single leg balance ?60 seconds; Restore normal stair climbing     *Timeframes in later phases of rehabilitation are estimates only.  Patients may be progressed faster/slower based on their ability to attain goals for each phase and radiographic healing.    Patient to return to [facility] for physical performance testing at approximately 16 weeks post-surgery     Please reference Return to Sport or High Physical Demand Occupation Protocol for return to further advanced activities                *Timeframes  in later phases of rehabilitation are estimates only.  Patients may be progressed faster/slower based on their ability to attain goals for each phase.    Patient to return to [facility] for physical performance testing at approximately 16 weeks post-surgery     Please reference Return to Sport or High Physical Demand Occupation Protocol for return to further advanced activities        [facility]  Home Care Following Anesthesia  For 24 hours after surgery:  Get plenty of rest.  A responsible adult must stay with you for at least 24 hours after you leave the surgery center.  Do not drive or use heavy equipment.  If you have weakness or tingling, don't drive or use heavy equipment until this feeling goes away.   Do not drink alcohol.   Avoid strenuous or risky activities.  Ask for help when climbing stairs.  You may feel lightheaded.  IF so, sit for a few minutes before standing.  Have someone help you get up.   If you have nausea (feel sick to your stomach): Drink only clear liquids such as apple juice, ginger ale, broth or 7-Up.  Rest may also help.  Be sure to drink enough fluids.  Move to a regular diet as you feel able.   You may have a slight fever.  Call the doctor if your fever is over 100 F (37.7 C) (taken under the tongue) or lasts longer than 24 hours.  You may have a dry mouth, a sore throat, muscle aches or trouble sleeping. These should go away after 24 hours.  Do not make important or legal decisions.   It is recommended to avoid smoking.               Tips for taking pain medications  To get the best pain relief possible, remember these points:  Take pain medications as directed, before pain becomes severe.  Pain medication can upset your stomach: taking it with food may help.  Constipation is a common side effect of pain medication. Drink plenty of  fluids.  Eat foods high in fiber. Take a stool softener if recommended  by your doctor or pharmacist.  Do not drink alcohol, drive or operate machinery while taking pain medications.  Ask about other ways to control pain, such as with heat, ice or relaxation.    Tylenol/Acetaminophen Consumption    If you feel your pain relief is insufficient, you may take Tylenol/Acetaminophen in addition to your narcotic pain medication.   Be careful not to exceed 4,000 mg of Tylenol/Acetaminophen in a 24 hour period from all sources.  If you are taking extra strength Tylenol/acetaminophen (500 mg), the maximum dose is 8 tablets in 24 hours.  If you are taking regular strength acetaminophen (325 mg), the maximum dose is 12 tablets in 24 hours.    Call a doctor for any of the following:  Signs of infection (fever, growing tenderness at the surgery site, a large amount of drainage or bleeding, severe pain, foul-smelling drainage, redness, swelling).  It has been over 8 to 10 hours since surgery and you are still not able to urinate (pass water).  Headache for over 24 hours.  Numbness, tingling or weakness the day after surgery (if you had spinal anesthesia).  Signs of Covid-19 infection (temperature over 100 degrees, shortness of breath, cough, loss of taste/smell, generalized body aches, persistent headache, chills, sore throat, nausea/vomiting/diarrhea)  Your doctor is:  Dr. Talia Preciado, Orthopaedics: [phone]                    Or dial [phone] and ask for the resident on call for:  Orthopaedics  For emergency care, call the:  [facility] Emergency Department: [phone] (TTY for hearing impaired: [phone])

## 2023-09-07 NOTE — BRIEF OP NOTE
Orthopaedic Surgery Brief Op-Note     Patient: Tiffanie Restrepo  : 1995  Date of Service: 2023 9:37 AM    Preoperative Diagnosis: Patellofemoral instability of left knee with pain [M25.362, M25.562]     Postoperative Diagnosis: same    Procedure: Procedure(s):  Left Knee Arthroscopy, Medial Patello-femoral Ligament Reconstruction with Allograft, carlitos-medial Tibial Tubercle Osteotomy, lateral retinacular lengthening with partial lateral facetectomy    Surgeon: Dr. Preciado    Assistants:  Rae Dupont MD    Anesthesia: General     Estimated Blood Loss: 100 cc    Tourniquet Time: 120 minutes at 250 mmHg     Specimen: none       Complications: none    Condition: stable    Findings: please see dictated operative note    Plan:  PWB, <50% weight bearing to operative extremity with hinged knee brace on and locked at 10 degrees and assistive devices as needed  Hinged knee brace is to be set at 10 degrees to 90 degrees; lock at 10 degrees flexion while ambulating; the brace is to be worn at all times except with range of motion exercises and CPM use  CPM to be set at 0 degrees to flexion tolerance with goal of 90 degrees; advance aggressively in 5 degree increments as tolerated by the patient  PT as outpatient; an order and PT protocol will be provided to the patient at time of discharge  ADAT  Pain control with orals prn  Bowel prophylaxis with senna-docusate  DVT prophylaxis:  mg daily x 4 weeks.      Future Appointments   Date Time Provider Department Center   2023 10:40 AM Kenia Lopez, PT [facility]   2023 10:40 AM Kenia Lopez, PT [facility]   2023  2:40 PM Vicki Beckett PA-C [facility]   2023 10:40 AM Kenia Lopez, PT [facility]   10/3/2023  9:40 AM Talia Preciado MD [facility]       Disposition: patient may be discharged with  once appropriate discharge criteria have been met    I assisted with positioning, prepping and draping,  and closure.    Rae Dupont MD, PGY4  Orthopedic Surgery   Pager: [phone]

## 2023-09-07 NOTE — ANESTHESIA CARE TRANSFER NOTE
Patient: Tiffanie Restrepo    Procedure: Procedure(s):  Left Knee Arthroscopy, Medial Patello-femoral Ligament Reconstruction with Allograft, carlitos-medial Tibial Tubercle Osteotomy, lateral retinacular lengthening with partial lateral facetectomy       Diagnosis: Patellofemoral instability of left knee with pain [M25.362, M25.562]  Diagnosis Additional Information: No value filed.    Anesthesia Type:   General     Note:      Level of Consciousness: awake  Oxygen Supplementation: face mask    Independent Airway: airway patency satisfactory and stable        Patient transferred to: Phase II    Handoff Report: Identifed the Patient, Identified the Reponsible Provider, Reviewed the pertinent medical history, Discussed the surgical course, Reviewed Intra-OP anesthesia mangement and issues during anesthesia, Set expectations for post-procedure period and Allowed opportunity for questions and acknowledgement of understanding      Vitals:  Vitals Value Taken Time   /73 09/07/23 1330   Temp 35.8  C (96.4  F) 09/07/23 1330   Pulse 80 09/07/23 1332   Resp 8 09/07/23 1332   SpO2 96 % 09/07/23 1332   Vitals shown include unvalidated device data.    Electronically Signed By: TED Rojas CRNA  September 7, 2023  1:33 PM

## 2023-09-07 NOTE — OR NURSING
Updated Dr. Lopez on pain 6-7/10, recent nausea (some relief w/zofran). Verbal order received fr Dsuvia.

## 2023-09-07 NOTE — OR NURSING
Upon reassessment, vitals stable, nausea at bay but ongoing pain of 6/10 to incision. Verbal order to give PO lorazepam 1mg.     Addendum: PO ativan unavailable; information passed to Urszula MILES RN who received new orders from Jessica.

## 2023-09-07 NOTE — ANESTHESIA PROCEDURE NOTES
Adductor canal Procedure Note    Pre-Procedure   Staff -        Anesthesiologist:  Lukas Miles MD       Resident/Fellow: Terrell Corbett MD       Performed By: resident and with residents       Procedure performed by resident/fellow/CRNA in presence of a teaching physician.         Location: OR       Pre-Anesthestic Checklist: patient identified, IV checked, site marked, risks and benefits discussed, informed consent, monitors and equipment checked, pre-op evaluation, at physician/surgeon's request and post-op pain management  Timeout:       Correct Patient: Yes        Correct Procedure: Yes        Correct Site: Yes        Correct Position: Yes        Correct Laterality: Yes        Site Marked: Yes  Procedure Documentation  Procedure: Adductor canal       Diagnosis: POSTOP PAIN       Laterality: left       Patient Position: supine       Patient Prep/Sterile Barriers: sterile gloves, mask       Skin prep: Chloraprep       Needle Gauge: 21.        Needle Length (millimeters): 110        Ultrasound guided       1. Ultrasound was used to identify targeted nerve, plexus, vascular marker, or fascial plane and place a needle adjacent to it in real-time.       2. Ultrasound was used to visualize the spread of anesthetic in close proximity to the above referenced structure.       3. A permanent image is entered into the patient's record.    Assessment/Narrative         The placement was negative for: blood aspirated, painful injection and site bleeding       Paresthesias: No.       Bolus given via needle. no blood aspirated via catheter.        Secured via.        Insertion/Infusion Method: Single Shot       Complications: none    Medication(s) Administered   Bupivacaine 0.5% w/ 1:200K Epi (Other) - Perineural   10 mL - 9/7/2023 9:53:00 AM   Comments:  Routine left adductor canal nerve block without complications. Patient tolerated well.      FOR [employer] ([facility]) ONLY:   Pain Team Contact information:  "please page the Pain Team Via [facility]. Search \"Pain\". During daytime hours, please page the attending first. At night please page the resident first.      "

## 2023-09-08 ENCOUNTER — TELEPHONE (OUTPATIENT)
Dept: ORTHOPEDICS | Facility: CLINIC | Age: 28
End: 2023-09-08
Payer: COMMERCIAL

## 2023-09-08 ENCOUNTER — NURSE TRIAGE (OUTPATIENT)
Dept: NURSING | Facility: CLINIC | Age: 28
End: 2023-09-08
Payer: COMMERCIAL

## 2023-09-08 ENCOUNTER — MEDICAL CORRESPONDENCE (OUTPATIENT)
Dept: HEALTH INFORMATION MANAGEMENT | Facility: CLINIC | Age: 28
End: 2023-09-08
Payer: COMMERCIAL

## 2023-09-08 ENCOUNTER — HOSPITAL ENCOUNTER (EMERGENCY)
Facility: CLINIC | Age: 28
Discharge: HOME | End: 2023-09-08
Attending: EMERGENCY MEDICINE | Admitting: EMERGENCY MEDICINE
Payer: COMMERCIAL

## 2023-09-08 VITALS
DIASTOLIC BLOOD PRESSURE: 82 MMHG | TEMPERATURE: 98.6 F | HEART RATE: 62 BPM | OXYGEN SATURATION: 98 % | SYSTOLIC BLOOD PRESSURE: 123 MMHG | BODY MASS INDEX: 27.84 KG/M2 | RESPIRATION RATE: 18 BRPM | WEIGHT: 194 LBS

## 2023-09-08 DIAGNOSIS — G89.18 POST-OP PAIN: ICD-10-CM

## 2023-09-08 PROCEDURE — 96361 HYDRATE IV INFUSION ADD-ON: CPT

## 2023-09-08 PROCEDURE — 250N000013 HC RX MED GY IP 250 OP 250 PS 637: Performed by: EMERGENCY MEDICINE

## 2023-09-08 PROCEDURE — 258N000003 HC RX IP 258 OP 636: Performed by: EMERGENCY MEDICINE

## 2023-09-08 PROCEDURE — 96374 THER/PROPH/DIAG INJ IV PUSH: CPT | Mod: 59

## 2023-09-08 PROCEDURE — 99284 EMERGENCY DEPT VISIT MOD MDM: CPT | Mod: 25

## 2023-09-08 PROCEDURE — 96375 TX/PRO/DX INJ NEW DRUG ADDON: CPT

## 2023-09-08 PROCEDURE — 96376 TX/PRO/DX INJ SAME DRUG ADON: CPT

## 2023-09-08 PROCEDURE — 250N000011 HC RX IP 250 OP 636: Mod: JZ | Performed by: EMERGENCY MEDICINE

## 2023-09-08 RX ORDER — HYDROMORPHONE HYDROCHLORIDE 1 MG/ML
0.5 INJECTION, SOLUTION INTRAMUSCULAR; INTRAVENOUS; SUBCUTANEOUS
Status: COMPLETED | OUTPATIENT
Start: 2023-09-08 | End: 2023-09-08

## 2023-09-08 RX ORDER — ONDANSETRON 2 MG/ML
4 INJECTION INTRAMUSCULAR; INTRAVENOUS ONCE
Status: COMPLETED | OUTPATIENT
Start: 2023-09-08 | End: 2023-09-08

## 2023-09-08 RX ORDER — OXYCODONE HYDROCHLORIDE 5 MG/1
10 TABLET ORAL ONCE
Status: COMPLETED | OUTPATIENT
Start: 2023-09-08 | End: 2023-09-08

## 2023-09-08 RX ORDER — DROPERIDOL 2.5 MG/ML
2.5 INJECTION, SOLUTION INTRAMUSCULAR; INTRAVENOUS ONCE
Status: COMPLETED | OUTPATIENT
Start: 2023-09-08 | End: 2023-09-08

## 2023-09-08 RX ADMIN — ONDANSETRON 4 MG: 2 INJECTION INTRAMUSCULAR; INTRAVENOUS at 18:10

## 2023-09-08 RX ADMIN — HYDROMORPHONE HYDROCHLORIDE 0.5 MG: 1 INJECTION, SOLUTION INTRAMUSCULAR; INTRAVENOUS; SUBCUTANEOUS at 19:35

## 2023-09-08 RX ADMIN — HYDROMORPHONE HYDROCHLORIDE 0.5 MG: 1 INJECTION, SOLUTION INTRAMUSCULAR; INTRAVENOUS; SUBCUTANEOUS at 20:11

## 2023-09-08 RX ADMIN — SODIUM CHLORIDE 1000 ML: 9 INJECTION, SOLUTION INTRAVENOUS at 18:09

## 2023-09-08 RX ADMIN — DROPERIDOL 2.5 MG: 2.5 INJECTION, SOLUTION INTRAMUSCULAR; INTRAVENOUS at 20:11

## 2023-09-08 RX ADMIN — HYDROMORPHONE HYDROCHLORIDE 0.5 MG: 1 INJECTION, SOLUTION INTRAMUSCULAR; INTRAVENOUS; SUBCUTANEOUS at 18:10

## 2023-09-08 RX ADMIN — OXYCODONE HYDROCHLORIDE 10 MG: 5 TABLET ORAL at 21:03

## 2023-09-08 ASSESSMENT — ACTIVITIES OF DAILY LIVING (ADL)
ADLS_ACUITY_SCORE: 35
ADLS_ACUITY_SCORE: 35
ADLS_ACUITY_SCORE: 33

## 2023-09-08 NOTE — ED NOTES
"Patient upset about intake process. \"I hate this place\" and \"I don't know what you think your going to test!\".   "

## 2023-09-08 NOTE — TELEPHONE ENCOUNTER
Telephone call  Patient calling she is having severe pain in her left knee she took 2  oxycodone  and  alternating  acetaminophen.  She is icing it constantly and she can not take the pain.  Was not able to find any discharge instructions so transferred her to the ortho clinic to speak with a ortho nurse.    Elham Maynard RN   [facility] Nurse Advisor  9:38 AM 9/8/2023

## 2023-09-08 NOTE — TELEPHONE ENCOUNTER
"A call was placed to the patient for post-op outreach. The follow was dicussed:      Post op pain management -   1) How often are you taking the oxycodone? How many tablets each time?  Taking 2 tablets every 4 hours as needed moderate-severe pain  *refill not needed at this time     2) Can you rate your pain (0-10) before and after administration?  Before: 8  After: 4     3) How would you describe the pain (e.g., dull, sharp, achy, tingly, cramping, etc.)?  \"pain\"    4) Are you taking any other pain medications?  tylenol every 4 hours as needed moderate-severe pain  **refill not needed at this time     5) Are you adhering to the elevating 21 hours a day and icing for 20 minutes every hour?  Yes    - We discussed the importance of tapering the use of the narcotic medications. I said the most successful tapering strategy is to first, decrease the number of tablets you take to the minimum prescribed. Then, increase the amount of time between doses. I explained the bedtime dose can help with comfort during sleep and is typically the last dose to be discontinued after surgery. Patient agreed to work on tapering as able.      - Pharmacy was verified. I let the patient know the request for opoid medications go onto our PA so they may not be filled immediately and someone may be reaching out with further questions.     Post op wound concerns - no concerns  Post op appointments scheduled and known to patient - Yes, confirmed with patient  Patient questions or concerns - No questions at this time  Gave phone number for clinic and after-hours line - Yes    - Call back number to clinic was given and patient had no further questions at this time.   icing and elevating and taking post-op medication as prescribed    "

## 2023-09-08 NOTE — ANESTHESIA POSTPROCEDURE EVALUATION
Patient: Tiffanie Restrepo    Procedure: Procedure(s):  Left Knee Arthroscopy, Medial Patello-femoral Ligament Reconstruction with Allograft, carlitos-medial Tibial Tubercle Osteotomy, lateral retinacular lengthening with partial lateral facetectomy       Anesthesia Type:  General    Note:  Disposition: Outpatient   Postop Pain Control: Uneventful            Sign Out: Well controlled pain   PONV: No   Neuro/Psych: Uneventful            Sign Out: Acceptable/Baseline neuro status   Airway/Respiratory: Uneventful            Sign Out: Acceptable/Baseline resp. status   CV/Hemodynamics: Uneventful            Sign Out: Acceptable CV status; No obvious hypovolemia; No obvious fluid overload   Other NRE: NONE   DID A NON-ROUTINE EVENT OCCUR? No           Last vitals:  Vitals Value Taken Time   /85 09/07/23 1400   Temp 36.1  C (97  F) 09/07/23 1400   Pulse 72 09/07/23 1404   Resp 7 09/07/23 1404   SpO2 95 % 09/07/23 1404   Vitals shown include unvalidated device data.    Electronically Signed By: Lukas Banerjee MD  September 8, 2023  11:51 AM

## 2023-09-08 NOTE — ED TRIAGE NOTES
Pt arrives with left knee pain after surgery yesterday. States she was given tylenol, ASA, and oxycodone for at home. Pt took 10 mg at 1200 today with minimal relief. ABCs intact and Aox4.      Triage Assessment       Row Name 09/08/23 1500       Triage Assessment (Adult)    Airway WDL WDL       Respiratory WDL    Respiratory WDL WDL       Cardiac WDL    Cardiac WDL WDL

## 2023-09-08 NOTE — ED NOTES
"Rapid Assessment Note    History:   Tiffanie Restrepo is a 28 year old female who presents with her father for post-operative left leg pain. Patient underwent a medial patello-femoral ligament reconstruction with allograft and lateral retinacular lengthening with partial latera facetectomy yesterday. Pain has since \"exploded\" and she says that she has been shaking and \"almost passed out.\" Patient was prescribed 10 mg Oxycodone and says that she has not been able to stay on top of the pain.      Exam:   General:  Alert, interactive  Cardiovascular:  Well perfused  Lungs:  No respiratory distress, no accessory muscle use  Neuro:  Moving all 4 extremities  Skin:  Warm, dry  Psych: Angry, agitated  Extremities: Left knee and hand brace.  Palpable distal pulse.    Plan of Care:   I evaluated the patient and developed an initial plan of care. I discussed this plan and explained that I, or one of my partners, would be returning to complete the evaluation.     Patient had the surgery yesterday and her oxycodone is not cutting it.  She was sent here by Ortho for pain management.  An IV will be established has been given IV Dilaudid.  No signs of infection or other concerns at this time.    I, Mariya Shah, am serving as a scribe to document services personally performed by Michelet Garza MD, based on my observations and the provider's statements to me.    9/8/2023  [facility]    Portions of this medical record were completed by a scribe. UPON MY REVIEW AND AUTHENTICATION BY ELECTRONIC SIGNATURE, this confirms (a) I performed the applicable clinical services, and (b) the record is accurate.      Michelet Garza MD  09/08/23 1808    "

## 2023-09-09 ENCOUNTER — TELEPHONE (OUTPATIENT)
Dept: OTHER | Facility: CLINIC | Age: 28
End: 2023-09-09
Payer: COMMERCIAL

## 2023-09-09 NOTE — ED PROVIDER NOTES
"    History     Chief Complaint:  Knee Pain       The history is provided by the patient.      Tiffanie Restrepo is a 28 year old female who presents with her father for post-operative left leg pain. Patient underwent a medial patello-femoral ligament reconstruction with allograft and lateral retinacular lengthening with partial latera facetectomy yesterday. Pain has since \"exploded\" and she says that she has been shaking and \"almost passed out.\" Patient was prescribed 10 mg Oxycodone and says that she has not been able to stay on top of the pain.       Independent Historian:    Father at bedside endorses the history as stated above.     Review of External Notes:  I reviewed her op note from yesterday.      Medications:    Norethindrone-ethinyl estradiol     Past Medical History:    Dysthymic disorder  Generalized anxiety disorder with panic attacks  Depersonalization disorder   Insomnia   Ovarian cyst   Ganglion cyst of volar aspect of left wrist   B12 deficiency   Chronic diarrhea   Bilateral sacroiliitis   Scoliosis   Headache syndrome     Past Surgical History:    Adenoidectomy   Left Knee Arthroscopy, Medial Patello-femoral Ligament Reconstruction with Allograft, carlitos-medial Tibial Tubercle Osteotomy, lateral retinacular lengthening with partial lateral facetectomy  Bilateral endoscopic maxillary and sphenoid sinus surgery   Tonsillectomy   Navicular partial exostectomy        Physical Exam   Patient Vitals for the past 24 hrs:   BP Temp Temp src Pulse Resp SpO2 Weight   09/08/23 2100 123/82 -- -- 62 -- 98 % --   09/08/23 2030 115/69 -- -- 55 -- 95 % --   09/08/23 2015 -- -- -- -- -- 99 % --   09/08/23 2000 (!) 152/99 -- -- 86 -- 100 % --   09/08/23 1950 -- -- -- -- -- 98 % --   09/08/23 1939 -- -- -- -- -- 96 % --   09/08/23 1938 123/77 -- -- 68 -- -- --   09/08/23 1502 (!) 145/92 98.6  F (37  C) Temporal 83 18 98 % 88 kg (194 lb)        Physical Exam  Constitutional: Vital signs reviewed.  Uncomfortable " appearing.   HEENT: Moist mucous membranes  Cardiovascular: Regular rate and rhythm  Pulmonary/Chest: Breathing comfortably on room air.  No audible wheezing  Musculoskeletal/Extremities: Hinged knee brace on the left knee.  Palpable dorsalis pedis pulse.  Normal light touch sensation distally.  Surgical dressing is clean dry and intact.  Neurological: Alert.  No focal deficits.  Endo: No pitting edema  Skin: No visible rash.  Psychiatric: Uncomfortable appearing.  Agitated.      Emergency Department Course       Emergency Department Course & Assessments:     Interventions:  Medications   HYDROmorphone (PF) (DILAUDID) injection 0.5 mg (0.5 mg Intravenous $Given 9/8/23 2011)   0.9% sodium chloride BOLUS (0 mLs Intravenous Stopped 9/8/23 1950)   ondansetron (ZOFRAN) injection 4 mg (4 mg Intravenous $Given 9/8/23 1810)   droPERidol (INAPSINE) injection 2.5 mg (2.5 mg Intravenous $Given 9/8/23 2011)   oxyCODONE (ROXICODONE) tablet 10 mg (10 mg Oral $Given 9/8/23 2103)        Assessments:  2045 I rechecked and updated the patient.     Independent Interpretation (X-rays, CTs, rhythm strip):  None    Consultations/Discussion of Management or Tests:  None       Social Determinants of Health affecting care:  None      Disposition:  The patient was discharged to home.     Impression & Plan      Medical Decision Making:  Patient presents to the emergency department 1 day after having knee surgery.  She presents with pain that has not been controlled by her oxycodone.  She uncomfortable in her appearance groaning in pain throughout the majority of her stay.  She is wearing her brace.  I discussed with her that the goal here would be to get an top of her pain with IV pain medications and then discharge her back home with a regimen.  She did require few doses of IV Dilaudid.  She was also rather agitated and uncomfortable so I gave her some droperidol.  She is much more calm and comfortable in her appearance now.  I discussed  again that I give her 1 dose of her 10 mg oxycodone and then we will be discharging her home.  It will be up to her to stay with her pain management program.  She had many questions about her postop care and her range of motion exercises etc.  I referred her back to orthopedics as that is not our specialty.  She states that she did not get any discharge instructions.  I informed her to call the nurse line as they could help her.  I stressed the importance of staying on top of her pain regiment but also reminded her that she is postop and there will be pain and it will hurt for her to do her range of motion exercises.  Ultimately she was discharged to home.    Diagnosis:    ICD-10-CM    1. Post-op pain  G89.18            Discharge Medications:  Discharge Medication List as of 9/8/2023  9:26 PM           Scribe Disclosure:  I, Mariya Alyssa, am serving as a scribe at 7:19 PM on 9/8/2023 to document services personally performed by Michelet Garza MD based on my observations and the provider's statements to me.    9/8/2023   Michelet Garza MD Walters, Brent Aaron, MD  09/08/23 9552

## 2023-09-09 NOTE — CONFIDENTIAL NOTE
Page received regarding a call back to the patient regarding her pain control.  The patient was reached by personal phone.  She states she has been having significant pain since surgery and needed to go to an emergency department yesterday.  Per chart review she was given a few doses of IV Dilaudid, droperidol and a single 10 mg dose of oxycodone.  She was then instructed to continue her current narcotic plan postop per orthopedics.  She states she called the helpline again today due to ongoing pain control issues and feeling that she will run out of oxycodone tablets prior to Monday morning.  She states she currently has 12 tablets left and she is taking 10 mg every 4 hours.  She is also alternating Tylenol, as well as, icing and elevating.  She notes she is also using her CPM machine.  We discussed that she should try to do her best to take 1-2 tabs of oxycodone every 4-6 hours and taper if she can.  We discussed first taking only 1 tab every 4 hours and then start spacing the time between each dose if she can tolerate this.  Otherwise we discussed ongoing icing and elevation.  The patient expressed understanding and was thankful for the call.  She will reach out Monday morning if she needs a refill on her medications, specifically oxycodone.    Rae Dupont MD, PGY4  Orthopedic Surgery

## 2023-09-09 NOTE — ED NOTES
Up to the bathroom with minimal assist after much coaching. Patient appears to have limited coping skills and a strained relationship with her father who is currently at bedside.     IVP medications given, ice and elevation utilized after return from the toilet.

## 2023-09-11 ENCOUNTER — MYC REFILL (OUTPATIENT)
Dept: ORTHOPEDICS | Facility: CLINIC | Age: 28
End: 2023-09-11
Payer: COMMERCIAL

## 2023-09-11 ENCOUNTER — TELEPHONE (OUTPATIENT)
Dept: ORTHOPEDICS | Facility: CLINIC | Age: 28
End: 2023-09-11
Payer: COMMERCIAL

## 2023-09-11 DIAGNOSIS — Z98.890 S/P KNEE SURGERY: ICD-10-CM

## 2023-09-11 RX ORDER — OXYCODONE HYDROCHLORIDE 5 MG/1
5-10 TABLET ORAL EVERY 4 HOURS PRN
Qty: 26 TABLET | Refills: 0 | Status: SHIPPED | OUTPATIENT
Start: 2023-09-11

## 2023-09-11 RX ORDER — OXYCODONE HYDROCHLORIDE 5 MG/1
5-10 TABLET ORAL EVERY 4 HOURS PRN
Qty: 26 TABLET | Refills: 0 | Status: SHIPPED | OUTPATIENT
Start: 2023-09-11 | End: 2023-09-11

## 2023-09-11 RX ORDER — ONDANSETRON 4 MG/1
4 TABLET, FILM COATED ORAL EVERY 8 HOURS PRN
Qty: 8 TABLET | Refills: 0 | Status: SHIPPED | OUTPATIENT
Start: 2023-09-11

## 2023-09-11 NOTE — TELEPHONE ENCOUNTER
[facility] Call Center    Phone Message    May a detailed message be left on voicemail: yes     Reason for Call: Other: Myles Zimmer is calling because is wanting to get a refill on her Oxycoden , she is you currently taking some that she had from a surgery 7 years ago and they are not working really well. She would like to talk to someone on the team. Please call her. Thank you, Cary     Action Taken: Other: [facility]    Travel Screening: Not Applicable

## 2023-09-11 NOTE — TELEPHONE ENCOUNTER
"The following questions were asked about the patient's pain:    1) How often are you taking the oxycodone? How many tablets each time?  Taking 2 tablets every 4 hours as needed moderate-severe pain  *refill requested for this medication    2) Can you rate your pain (0-10) before and after administration?  Before: 8  After: 4     3) How would you describe the pain (e.g., dull, sharp, achy, tingly, cramping, etc.)?  \"pain\"    4) Are you taking any other pain medications?  tylenol every 4 hours as needed moderate-severe pain  **refill not needed at this time   ibuprofen every 6 hours as needed moderate-severe pain  **refill not needed at this time     5) Are you adhering to the elevating 21 hours a day and icing for 20 minutes every hour?  Yes;     - Spreading out to 6 hours between pills.     - We discussed the importance of tapering the use of the narcotic medications. I said the most successful tapering strategy is to first, decrease the number of tablets you take to the minimum prescribed. Then, increase the amount of time between doses. I explained the bedtime dose can help with comfort during sleep and is typically the last dose to be discontinued after surgery. Patient agreed to work on tapering as able.     - Pharmacy was verified. I let the patient know the request for opoid medications go onto our PA so they may not be filled immediately and someone may be reaching out with further questions.     - Call back number to clinic was given and patient had no further questions at this time.     "

## 2023-09-11 NOTE — TELEPHONE ENCOUNTER
- A call was placed to the patient.     - Let patient know med is ready at her pharmacy.     - Patient verbalized understanding of plan and all questions were answered. Call back number to clinic was given and patient was told to call if they had an further questions.

## 2023-09-14 ENCOUNTER — TELEPHONE (OUTPATIENT)
Dept: ORTHOPEDICS | Facility: CLINIC | Age: 28
End: 2023-09-14

## 2023-09-14 ENCOUNTER — THERAPY VISIT (OUTPATIENT)
Dept: PHYSICAL THERAPY | Facility: CLINIC | Age: 28
End: 2023-09-14
Attending: ORTHOPAEDIC SURGERY
Payer: COMMERCIAL

## 2023-09-14 DIAGNOSIS — Z98.890 S/P KNEE SURGERY: ICD-10-CM

## 2023-09-14 DIAGNOSIS — M25.362 PATELLOFEMORAL INSTABILITY OF LEFT KNEE WITH PAIN: ICD-10-CM

## 2023-09-14 DIAGNOSIS — M25.562 PATELLOFEMORAL INSTABILITY OF LEFT KNEE WITH PAIN: ICD-10-CM

## 2023-09-14 PROCEDURE — 97161 PT EVAL LOW COMPLEX 20 MIN: CPT | Mod: GP | Performed by: PHYSICAL THERAPIST

## 2023-09-14 PROCEDURE — 97110 THERAPEUTIC EXERCISES: CPT | Mod: GP | Performed by: PHYSICAL THERAPIST

## 2023-09-14 ASSESSMENT — ACTIVITIES OF DAILY LIVING (ADL)
LIMPING: THE SYMPTOM PREVENTS ME FROM ALL DAILY ACTIVITIES
WALK: I AM UNABLE TO DO THE ACTIVITY
KNEE_ACTIVITY_OF_DAILY_LIVING_SUM: 2
SQUAT: I AM UNABLE TO DO THE ACTIVITY
GO DOWN STAIRS: I AM UNABLE TO DO THE ACTIVITY
WEAKNESS: THE SYMPTOM PREVENTS ME FROM ALL DAILY ACTIVITIES
RAW_SCORE: 2
STIFFNESS: THE SYMPTOM PREVENTS ME FROM ALL DAILY ACTIVITIES
KNEEL ON THE FRONT OF YOUR KNEE: I AM UNABLE TO DO THE ACTIVITY
HOW_WOULD_YOU_RATE_THE_OVERALL_FUNCTION_OF_YOUR_KNEE_DURING_YOUR_USUAL_DAILY_ACTIVITIES?: SEVERELY ABNORMAL
AS_A_RESULT_OF_YOUR_KNEE_INJURY,_HOW_WOULD_YOU_RATE_YOUR_CURRENT_LEVEL_OF_DAILY_ACTIVITY?: SEVERELY ABNORMAL
KNEE_ACTIVITY_OF_DAILY_LIVING_SCORE: 2.86
RISE FROM A CHAIR: ACTIVITY IS VERY DIFFICULT
PAIN: THE SYMPTOM PREVENTS ME FROM ALL DAILY ACTIVITIES
STAND: I AM UNABLE TO DO THE ACTIVITY
SWELLING: THE SYMPTOM PREVENTS ME FROM ALL DAILY ACTIVITIES
SIT WITH YOUR KNEE BENT: ACTIVITY IS VERY DIFFICULT
GIVING WAY, BUCKLING OR SHIFTING OF KNEE: THE SYMPTOM PREVENTS ME FROM ALL DAILY ACTIVITIES
HOW_WOULD_YOU_RATE_THE_CURRENT_FUNCTION_OF_YOUR_KNEE_DURING_YOUR_USUAL_DAILY_ACTIVITIES_ON_A_SCALE_FROM_0_TO_100_WITH_100_BEING_YOUR_LEVEL_OF_KNEE_FUNCTION_PRIOR_TO_YOUR_INJURY_AND_0_BEING_THE_INABILITY_TO_PERFORM_ANY_OF_YOUR_USUAL_DAILY_ACTIVITIES?: 10
GO UP STAIRS: I AM UNABLE TO DO THE ACTIVITY

## 2023-09-14 NOTE — TELEPHONE ENCOUNTER
[facility] Call Center    Phone Message    May a detailed message be left on voicemail: yes     Reason for Call: Other: Patient is returning call to [place] with Dr Calderon. Please call back when available.     Action Taken: Message routed to:  [facility] ([facility]): [phone]    Travel Screening: Not Applicable

## 2023-09-14 NOTE — TELEPHONE ENCOUNTER
- A call was placed to the patient. Patient did not answer phone so a voicemail was left.     - Call back number to clinic was given and patient was told to call back and ask for Dr. Yumiko Simental's nurse.     - checked in with patient about pain medication before the weekend.

## 2023-09-14 NOTE — PROGRESS NOTES
PHYSICAL THERAPY EVALUATION  Type of Visit: Evaluation    See electronic medical record for Abuse and Falls Screening details.    Subjective       Presenting condition or subjective complaint: S/p left knee MPFL, tib tub osteomtomy.  Date of onset: 23    Relevant medical history:     Dates & types of surgery: 2023 (see above); necrotic navicular    Prior diagnostic imaging/testing results: MRI; X-ray     Prior therapy history for the same diagnosis, illness or injury: Yes        Living Environment  Social support: With a significant other or spouse   Type of home: [facility]   Stairs to enter the home: Yes 14 Is there a railing: Yes   Ramp: No   Stairs inside the home: Yes 14 Is there a railing: Yes   Help at home:    Equipment owned: Crutches     Employment: No  on hold  Hobbies/Interests: [place] sk[employer]    Patient goals for therapy: walking, stairs,    Pain assessment: Pain present  Location: L knee/Ratin/10     Objective   KNEE:    Gait: comes into clinic today in wheelchair, has crutches with (notes walking apprx 50% Wbing on L at home with crutches, but only short distances before becomes too painful)    Functional:   -sit to stand: uses arms to help up        PROM   L R   Hyperextension    5   Extension   1* 0   Flexion   50* 140     Strength:  QS's: poor on left; good on right      Pt education: on rehab protocol and phases of progressions    Assessment & Plan   CLINICAL IMPRESSIONS  Medical Diagnosis: L knee post op: MPFL reconstruction, Tibial Tub osteotomy    Treatment Diagnosis: L knee decreased ROM, and strength   Impression/Assessment: Patient is a 28 year old female with left knee complaints.  The following significant findings have been identified: Pain, Decreased ROM/flexibility, Decreased joint mobility, Decreased strength, Impaired balance, Decreased proprioception, Inflammation, Edema, Impaired gait, Impaired muscle performance, Decreased activity tolerance, Impaired posture, and  Instability. These impairments interfere with their ability to perform self care tasks, work tasks, recreational activities, household chores, driving , household mobility, and community mobility as compared to previous level of function.     Clinical Decision Making (Complexity):  Clinical Presentation: Stable/Uncomplicated  Clinical Presentation Rationale: based on medical and personal factors listed in PT evaluation  Clinical Decision Making (Complexity): Low complexity    PLAN OF CARE  Treatment Interventions:  Interventions: Gait Training, Manual Therapy, Neuromuscular Re-education, Therapeutic Activity, Therapeutic Exercise    Long Term Goals     PT Goal 1  Goal Identifier: Goal 1  Goal Description: Pt will be able to go up and down stairs, without a railing, painfree  Rationale: to maximize safety and independence within the home  Target Date: 12/07/23  PT Goal 2  Goal Identifier: Goal 2  Goal Description: Pt wll be able to walk, without AD, household distance, with no pain  Rationale: to maximize safety and independence with performance of ADLs and functional tasks;to maximize safety and independence within the home  Target Date: 12/07/23      Frequency of Treatment: 1X/week  Duration of Treatment: 12 weeks      Education Assessment:   Learner/Method: Patient;Pictures/Video  Education Comments: education on post op protocol, on restrictions    Risks and benefits of evaluation/treatment have been explained.   Patient/Family/caregiver agrees with Plan of Care.     Evaluation Time:     PT Eval, Low Complexity Minutes (00418): 15       Signing Clinician: Kenia Lopez, PT      [facility]                                                                                   OUTPATIENT PHYSICAL THERAPY      PLAN OF TREATMENT FOR OUTPATIENT REHABILITATION   Patient's Last Name, First Name, Tiffanie Gillis YOB: 1995   Provider's Name   [facility]   Medical Record No.  [id number]     Onset Date: 09/07/23  Start of Care Date: 09/14/23     Medical Diagnosis:  L knee post op: MPFL reconstruction, Tibial Tub osteotomy      PT Treatment Diagnosis:  L knee decreased ROM, and strength Plan of Treatment  Frequency/Duration: 1X/week/ 12 weeks    Certification date from 09/14/23 to 12/07/23         See note for plan of treatment details and functional goals     Kenia Lopez, PT                         I CERTIFY THE NEED FOR THESE SERVICES FURNISHED UNDER        THIS PLAN OF TREATMENT AND WHILE UNDER MY CARE     (Physician attestation of this document indicates review and certification of the therapy plan).                Referring Provider:  Talia Preciado      Initial Assessment  See Epic Evaluation- Start of Care Date: 09/14/23

## 2023-09-14 NOTE — TELEPHONE ENCOUNTER
- A call was placed to the patient.     - Talked to patient about her pain as a check in. Patient stated discharge instructions weren't thorough. Patient went too fast on CPM machine and had pain from that. Has gone to PT.     - Patient verbalized understanding of plan and all questions were answered. Call back number to clinic was given and patient was told to call if they had an further questions.

## 2023-09-15 ENCOUNTER — TELEPHONE (OUTPATIENT)
Dept: ORTHOPEDICS | Facility: CLINIC | Age: 28
End: 2023-09-15
Payer: COMMERCIAL

## 2023-09-15 NOTE — TELEPHONE ENCOUNTER
[facility] Call Center    Phone Message    May a detailed message be left on voicemail: yes     Reason for Call: Other: Patient is supposed to be removing bandages on Sunday not comfortable doing alone, would like to be seen at least by nurse can also go to . Please call patient  would like to speak to a member of her care team       Action Taken: Message routed to:  [facility] ([facility]): Ozzy     Travel Screening: Not Applicable

## 2023-09-21 ENCOUNTER — THERAPY VISIT (OUTPATIENT)
Dept: PHYSICAL THERAPY | Facility: CLINIC | Age: 28
End: 2023-09-21
Attending: ORTHOPAEDIC SURGERY
Payer: COMMERCIAL

## 2023-09-21 DIAGNOSIS — Z98.890 S/P KNEE SURGERY: Primary | ICD-10-CM

## 2023-09-21 PROCEDURE — 97110 THERAPEUTIC EXERCISES: CPT | Mod: GP | Performed by: PHYSICAL THERAPIST

## 2023-09-21 PROCEDURE — 97112 NEUROMUSCULAR REEDUCATION: CPT | Mod: GP | Performed by: PHYSICAL THERAPIST

## 2023-09-22 ENCOUNTER — OFFICE VISIT (OUTPATIENT)
Dept: ORTHOPEDICS | Facility: CLINIC | Age: 28
End: 2023-09-22
Payer: COMMERCIAL

## 2023-09-22 DIAGNOSIS — M25.562 PATELLOFEMORAL INSTABILITY OF LEFT KNEE WITH PAIN: ICD-10-CM

## 2023-09-22 DIAGNOSIS — M25.362 PATELLOFEMORAL INSTABILITY OF LEFT KNEE WITH PAIN: ICD-10-CM

## 2023-09-22 DIAGNOSIS — Z98.890 S/P KNEE SURGERY: Primary | ICD-10-CM

## 2023-09-22 PROCEDURE — 99024 POSTOP FOLLOW-UP VISIT: CPT | Performed by: PHYSICIAN ASSISTANT

## 2023-09-22 NOTE — NURSING NOTE
Reason For Visit:   Chief Complaint   Patient presents with    Surgical Followup     DOS: 9/7/23 Knee Arthroscopy, Medial Patello-femoral Ligament Reconstruction with Allograft, carlitos-medial plus distal Tibial Tubercle Osteotomy, lateral retinacular lengthening with partial lateral facetectomy       LMP  (LMP Unknown)     Pain Assessment  Patient Currently in Pain: Yes  0-10 Pain Scale: 2  Primary Pain Location: Knee (Left)    Yi Goode ATC

## 2023-09-22 NOTE — PROGRESS NOTES
ASSESSMENT/PLAN:  Tiffanie Restrepo is a 28 year old who is status post left knee MPFL reconstruction, carlitos-medial tibial tubercle osteotomy, LRL, partial lateral facetectomy with Dr. Preciado on 9/7/23.    The incision was examined and cleansed today. The patient was given instructions to refrain from submerging in a tub or a pool until it is a well-healed scar. They may shower and pat dry with gentle soapy lather.   She will continue to work with PT on the TTO protocol, partial weightbearing.     The patient will see Dr. Preciado in 2 weeks on 10/3/23 with x-ray of the knee at that time and will discuss progression of weightbearing at that time. Tiffanie has our clinic number and will call with any questions or concerns.    Vicki Beckett PA-C  Orthopaedic Surgery    _________________________________    HISTORY OF PRESENT ILLNESS:  Tiffanie Restrepo is a 28 year old female who is approximately 2 weeks status post the above procedure.    Weight bearing status/devices: crutches, hinged knee braced locked at 10 degrees.    Pain level and management: pain is now minimal, well-controlled. She has weaned from the narcotics. Initially had difficulty with pain management and running out of medications on the weekend, but she now feels pain is well-controlled. Some itching at incisions.  Physical therapy & ROM: [facility], using CPM at home, range of motion to 90 degrees, coming along well.     The patient endorses swelling around the surgical incision but denies surrounding redness. The incision has been dry, without discharge or drainage. Tiffanie denies recent fevers and chills, as well as any other symptoms concerning for infection.     DVT prophylaxis: ASA 81mg BID x 4 weeks  Patient denies calf pain or tenderness.      MEDICATIONS:   Current Outpatient Rx   Medication Sig Dispense Refill    acetaminophen (TYLENOL) 325 MG tablet Take 2 tablets (650 mg) by mouth every 4 hours as needed for mild pain 50  tablet 0    aspirin 81 MG EC tablet Take 1 tablet (81 mg) by mouth 2 times daily (with meals) for 28 days 56 tablet 0    norethindrone-ethinyl estradiol (JUNEL FE 1/20) 1-20 MG-MCG tablet Take 1 tablet by mouth daily      norgestimate-ethinyl estradiol (ORTHO-CYCLEN, SPRINTEC) 0.25-35 MG-MCG per tablet Take 1 tablet by mouth daily      ondansetron (ZOFRAN ODT) 4 MG ODT tab Take 1 tablet (4 mg) by mouth every 8 hours as needed for nausea 4 tablet 0    ondansetron (ZOFRAN) 4 MG tablet Take 1 tablet (4 mg) by mouth every 8 hours as needed for nausea 8 tablet 0    oxyCODONE (ROXICODONE) 5 MG tablet Take 1-2 tablets (5-10 mg) by mouth every 4 hours as needed for moderate to severe pain 26 tablet 0    senna-docusate (SENOKOT-S/PERICOLACE) 8.6-50 MG tablet Take 1-2 tablets by mouth 2 times daily 30 tablet 0         ALLERGIES: Duloxetine, Fluoxetine, Cefdinir, and Sulfa antibiotics       PHYSICAL EXAMINATION:   On physical examination the patient is comfortable and is in no acute distress. The affect is appropriate and breathing is non-labored.    Surgical wound: The surgical wound was exposed and found to be clean and dry, without drainage or discharge. There is mild edema around the incision. There is no erythema. The skin was appropriately warm to touch.     ROM: 0-70 degrees on exam  Isometric activation of the quadriceps: in tact  SLR: unable to demonstrate  Gait: ambulating, partial weightbearing in the brace.     Motor intact distally throughout the tibial and peroneal nerve distributions, 5/5 strength with tibialis anterior, gastrocnemius and soleus, EHL, FHL firing  Sensation intact to light touch throughout superficial peroneal, deep peroneal, tibial, saphenous, and sural nerves  Dorsalis pedis and posterior tibial pulses palpable, toes warm and well-perfused

## 2023-09-22 NOTE — LETTER
9/22/2023         RE: Tiffanie Restrepo  [address] [place] [address]        Dear Colleague,    Thank you for referring your patient, Tiffanie Restrepo, to the [facility] [place]. Please see a copy of my visit note below.    ASSESSMENT/PLAN:  Tiffanie Restrepo is a 28 year old who is status post left knee MPFL reconstruction, carlitos-medial tibial tubercle osteotomy, LRL, partial lateral facetectomy with Dr. Preciado on 9/7/23.    The incision was examined and cleansed today. The patient was given instructions to refrain from submerging in a tub or a pool until it is a well-healed scar. They may shower and pat dry with gentle soapy lather.   She will continue to work with PT on the TTO protocol, partial weightbearing.     The patient will see Dr. Preciado in 2 weeks on 10/3/23 with x-ray of the knee at that time and will discuss progression of weightbearing at that time. Tiffanie has our clinic number and will call with any questions or concerns.    Vicki Beckett PA-C  Orthopaedic Surgery    _________________________________    HISTORY OF PRESENT ILLNESS:  Tiffanie Restrepo is a 28 year old female who is approximately 2 weeks status post the above procedure.    Weight bearing status/devices: crutches, hinged knee braced locked at 10 degrees.    Pain level and management: pain is now minimal, well-controlled. She has weaned from the narcotics. Initially had difficulty with pain management and running out of medications on the weekend, but she now feels pain is well-controlled. Some itching at incisions.  Physical therapy & ROM: [facility], using CPM at home, range of motion to 90 degrees, coming along well.     The patient endorses swelling around the surgical incision but denies surrounding redness. The incision has been dry, without discharge or drainage. Tiffanie denies recent fevers and chills, as well as any other symptoms concerning for infection.      DVT prophylaxis: ASA 81mg BID x 4 weeks  Patient denies calf pain or tenderness.      MEDICATIONS:   Current Outpatient Rx   Medication Sig Dispense Refill    acetaminophen (TYLENOL) 325 MG tablet Take 2 tablets (650 mg) by mouth every 4 hours as needed for mild pain 50 tablet 0    aspirin 81 MG EC tablet Take 1 tablet (81 mg) by mouth 2 times daily (with meals) for 28 days 56 tablet 0    norethindrone-ethinyl estradiol (JUNEL FE 1/20) 1-20 MG-MCG tablet Take 1 tablet by mouth daily      norgestimate-ethinyl estradiol (ORTHO-CYCLEN, SPRINTEC) 0.25-35 MG-MCG per tablet Take 1 tablet by mouth daily      ondansetron (ZOFRAN ODT) 4 MG ODT tab Take 1 tablet (4 mg) by mouth every 8 hours as needed for nausea 4 tablet 0    ondansetron (ZOFRAN) 4 MG tablet Take 1 tablet (4 mg) by mouth every 8 hours as needed for nausea 8 tablet 0    oxyCODONE (ROXICODONE) 5 MG tablet Take 1-2 tablets (5-10 mg) by mouth every 4 hours as needed for moderate to severe pain 26 tablet 0    senna-docusate (SENOKOT-S/PERICOLACE) 8.6-50 MG tablet Take 1-2 tablets by mouth 2 times daily 30 tablet 0         ALLERGIES: Duloxetine, Fluoxetine, Cefdinir, and Sulfa antibiotics       PHYSICAL EXAMINATION:   On physical examination the patient is comfortable and is in no acute distress. The affect is appropriate and breathing is non-labored.    Surgical wound: The surgical wound was exposed and found to be clean and dry, without drainage or discharge. There is mild edema around the incision. There is no erythema. The skin was appropriately warm to touch.     ROM: 0-70 degrees on exam  Isometric activation of the quadriceps: in tact  SLR: unable to demonstrate  Gait: ambulating, partial weightbearing in the brace.     Motor intact distally throughout the tibial and peroneal nerve distributions, 5/5 strength with tibialis anterior, gastrocnemius and soleus, EHL, FHL firing  Sensation intact to light touch throughout superficial peroneal, deep peroneal,  tibial, saphenous, and sural nerves  Dorsalis pedis and posterior tibial pulses palpable, toes warm and well-perfused        Vicki Beckett PA-C

## 2023-09-25 ENCOUNTER — THERAPY VISIT (OUTPATIENT)
Dept: PHYSICAL THERAPY | Facility: CLINIC | Age: 28
End: 2023-09-25
Payer: COMMERCIAL

## 2023-09-25 DIAGNOSIS — Z98.890 S/P LIGAMENT REPAIR: ICD-10-CM

## 2023-09-25 DIAGNOSIS — Z98.890 S/P KNEE SURGERY: Primary | ICD-10-CM

## 2023-09-25 PROCEDURE — 97110 THERAPEUTIC EXERCISES: CPT | Mod: GP | Performed by: PHYSICAL THERAPIST

## 2023-09-25 PROCEDURE — 97112 NEUROMUSCULAR REEDUCATION: CPT | Mod: GP | Performed by: PHYSICAL THERAPIST

## 2023-09-26 ENCOUNTER — TELEPHONE (OUTPATIENT)
Dept: ORTHOPEDICS | Facility: CLINIC | Age: 28
End: 2023-09-26
Payer: COMMERCIAL

## 2023-09-26 NOTE — TELEPHONE ENCOUNTER
- A call was placed to the patient.     - Requesting medical necessity letter to extend CPM usage per her PT. Spoke with patient and she feels comfortable returning the CPM now since she is at 90 degrees and doesn't feel she needs it. Patient going to return CPM instead of attempting to extend usage with it.     - Patient verbalized understanding of plan and all questions were answered. Call back number to clinic was given and patient was told to call if they had an further questions.

## 2023-09-28 ENCOUNTER — THERAPY VISIT (OUTPATIENT)
Dept: PHYSICAL THERAPY | Facility: CLINIC | Age: 28
End: 2023-09-28
Attending: ORTHOPAEDIC SURGERY
Payer: COMMERCIAL

## 2023-09-28 DIAGNOSIS — Z98.890 S/P LIGAMENT REPAIR: Primary | ICD-10-CM

## 2023-09-28 PROCEDURE — 97110 THERAPEUTIC EXERCISES: CPT | Mod: GP | Performed by: PHYSICAL THERAPIST

## 2023-09-28 PROCEDURE — 97112 NEUROMUSCULAR REEDUCATION: CPT | Mod: GP | Performed by: PHYSICAL THERAPIST

## 2023-10-02 ENCOUNTER — THERAPY VISIT (OUTPATIENT)
Dept: PHYSICAL THERAPY | Facility: CLINIC | Age: 28
End: 2023-10-02
Payer: COMMERCIAL

## 2023-10-02 DIAGNOSIS — Z98.890 S/P LIGAMENT REPAIR: Primary | ICD-10-CM

## 2023-10-02 PROCEDURE — 97112 NEUROMUSCULAR REEDUCATION: CPT | Mod: GP | Performed by: PHYSICAL THERAPIST

## 2023-10-02 PROCEDURE — 97110 THERAPEUTIC EXERCISES: CPT | Mod: GP | Performed by: PHYSICAL THERAPIST

## 2023-10-02 NOTE — PROGRESS NOTES
10/02/23 0500   Appointment Info   Signing clinician's name / credentials Kenia Lopez, PT, SCS   Total/Authorized Visits E&T   Visits Used 5   Medical Diagnosis L knee post op: MPFL reconstruction, Tibial Tub osteotomy   PT Tx Diagnosis L knee decreased ROM, and strength   Precautions/Limitations see MD protocols   Quick Adds Certification   Progress Note/Certification   Start of Care Date 09/14/23   Onset of illness/injury or Date of Surgery 09/07/23   Therapy Frequency 1X/week   Predicted Duration 12 weeks   Certification date from 09/14/23   Certification date to 12/07/23   GOALS   PT Goals 2   PT Goal 1   Goal Identifier Goal 1   Goal Description Pt will be able to go up and down stairs, without a railing, painfree   Rationale to maximize safety and independence within the home   Target Date 12/07/23   PT Goal 2   Goal Identifier Goal 2   Goal Description Pt wll be able to walk, without AD, household distance, with no pain   Rationale to maximize safety and independence with performance of ADLs and functional tasks;to maximize safety and independence within the home   Target Date 12/07/23   Subjective Report   Subjective Report Overall feeling well. Minimal pain in the day. Exercises going well (stretches 2-3Xday). Quad sets 100's/day. SLR with brace (standing). Icing daily for swelling.   Objective Measures   Objective Measures Objective Measure 1;Objective Measure 2   Objective Measure 1   Objective Measure AAROM L knee   Details 0-90   Objective Measure 2   Objective Measure Swelling (mid palla)   Details R=41cm  L=43cm   Objective Measure 3   Objective Measure Quad activation   Details good on left, good superior migration of patella   Treatment Interventions (PT)   Interventions Therapeutic Procedure/Exercise;Neuromuscular Re-education   Therapeutic Procedure/Exercise   Therapeutic Procedures: strength, endurance, ROM, flexibillity minutes (86025) 25   Therapeutic Procedures Ther Proc 2;Ther Proc 3    Ther Proc 1 Pt education   Ther Proc 1 - Details POC, home program   Ther Proc 2 Nustep (seat 12; arms 8)   Ther Proc 2 - Details X8 min (level 1)   PTRx Ther Proc 2 Towel Stretch Gastroc   PTRx Ther Proc 2 - Details 3 X 30 sec    PTRx Ther Proc 3 Heel Slides   PTRx Ther Proc 3 - Details hep   PTRx Ther Proc 4 Active Assisted Knee Flexion   PTRx Ther Proc 4 - Details CPM   PTRx Ther Proc 5 Standing Hip Flexion Straight Leg Raise   PTRx Ther Proc 5 - Details X10 Brace on   PTRx Ther Proc 6 Hip Abduction Straight Leg Raise   PTRx Ther Proc 6 - Details X30 (brace on)   PTRx Ther Proc 7 Hip Extension Straight Leg Raise   PTRx Ther Proc 7 - Details X10 (brace on)   PTRx Ther Proc 8 Isometric Quad   PTRx Ther Proc 8 - Details rev- 100's/day (good superior patellar motion)   Skilled Intervention instructed on exercsies to help reduce pain and increase motion   Ther Proc 3 knee bends (at bar- 50% WBing, shallow 30 deg)   Ther Proc 3 - Details X10   Therapeutic Activity   PTRx Ther Act 1 Icing   PTRx Ther Act 1 - Details No Notes   Neuromuscular Re-education   Neuromuscular re-ed of mvmt, balance, coord, kinesthetic sense, posture, proprioception minutes (75228) 15   Neuro Re-ed 1 Russian stim (NMES) w/ quad isometrics- sitting up (knee ext)   Neuro Re-ed 1 - Details X15 min (27 intensity, electrodes on quads, 10sec on/10 sec off)   Skilled Intervention instructed and set up on russian stim to improve quad activation   Patient Response/Progress tolerated well   Education   Learner/Method Patient;Pictures/Video   Education Comments education on post op protocol, on restrictions   Plan   Home program See PTRx   Total Session Time   Timed Code Treatment Minutes 40   Total Treatment Time (sum of timed and untimed services) 40     PLAN  Continue therapy per current plan of care.    Beginning/End Dates of Progress Note Reporting Period:  From: 10/02/2023    Referring Provider:  Talia Preciado

## 2023-10-03 ENCOUNTER — OFFICE VISIT (OUTPATIENT)
Dept: ORTHOPEDICS | Facility: CLINIC | Age: 28
End: 2023-10-03
Payer: COMMERCIAL

## 2023-10-03 ENCOUNTER — ANCILLARY PROCEDURE (OUTPATIENT)
Dept: GENERAL RADIOLOGY | Facility: CLINIC | Age: 28
End: 2023-10-03
Attending: ORTHOPAEDIC SURGERY
Payer: COMMERCIAL

## 2023-10-03 DIAGNOSIS — Z98.890 S/P KNEE SURGERY: ICD-10-CM

## 2023-10-03 DIAGNOSIS — Z98.890 S/P KNEE SURGERY: Primary | ICD-10-CM

## 2023-10-03 PROCEDURE — 73560 X-RAY EXAM OF KNEE 1 OR 2: CPT | Mod: LT | Performed by: RADIOLOGY

## 2023-10-03 PROCEDURE — 99024 POSTOP FOLLOW-UP VISIT: CPT | Performed by: ORTHOPAEDIC SURGERY

## 2023-10-03 NOTE — LETTER
10/3/2023         RE: Tiffanie Restrepo  [address] [place] [address]        Dear Colleague,    Thank you for referring your patient, Tiffanie Restrepo, to the [facility] [place]. Please see a copy of my visit note below.    Patient is a 28-year-old female who is just shy of 4 weeks status post MPFL reconstruction distal TTO.  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  She is taking Motrin and Tylenol only for pain.    She continues to take a single aspirin a day.    She has been going to physical therapy in [facility].  She is using the CPM and has asked to keep it for 2 more weeks.    Physical exam of patient's left knee reveals benign-appearing incisions good straight leg raising effort without a lag.  Range of motion 0 to 96 degrees with a soft endpoint.    Passive patella mobility 1+ quadrant lateral mobility with a firm endpoint    Mild saphenous nerve hypoesthesia to direct touch.  No Tinel's at the medial incision    X-rays were reviewed.  They show satisfactory hardware positioning in the TTO.  C/D = 0.98    Assessment: Satisfactory postoperative course at this point in time    Plan: Continue along the TTO pathway.  Follow-up 1 month time.  A single lateral x-ray will be taken prior to seeing me.    Tlaia Preciado MD  Professor Orthopedic Surgery  [facility]

## 2023-10-03 NOTE — NURSING NOTE
Reason For Visit:   Chief Complaint   Patient presents with    RECHECK     DOS: 9/7/23 4wk POP // Knee Arthroscopy, Medial Patello-femoral Ligament Reconstruction with Allograft, carlitos-medial plus distal Tibial Tubercle Osteotomy, lateral retinacular lengthening with partial lateral facetectomy         Primary MD: [place]  Ref. MD: EST    ?  No  Currently working? no  Work status?  medical leave.    Date of surgery: 9/7/23   Type of surgery: Left Knee Arthroscopy, Medial Patello-femoral Ligament Reconstruction with Allograft, carlitos-medial Tibial Tubercle Osteotomy, lateral retinacular lengthening with partial lateral facetectomy     Smoker: No  Request smoking cessation information: No      LMP  (LMP Unknown)     Pain Assessment  Patient Currently in Pain: Yes  0-10 Pain Scale: 2  Primary Pain Location: Knee       Delfina Formato, LPN

## 2023-10-03 NOTE — PROGRESS NOTES
Patient is a 28-year-old female who is just shy of 4 weeks status post MPFL reconstruction distal TTO.  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  She is taking Motrin and Tylenol only for pain.    She continues to take a single aspirin a day.    She has been going to physical therapy in [facility].  She is using the CPM and has asked to keep it for 2 more weeks.    Physical exam of patient's left knee reveals benign-appearing incisions good straight leg raising effort without a lag.  Range of motion 0 to 96 degrees with a soft endpoint.    Passive patella mobility 1+ quadrant lateral mobility with a firm endpoint    Mild saphenous nerve hypoesthesia to direct touch.  No Tinel's at the medial incision    X-rays were reviewed.  They show satisfactory hardware positioning in the TTO.  C/D = 0.98    Assessment: Satisfactory postoperative course at this point in time    Plan: Continue along the TTO pathway.  Follow-up 1 month time.  A single lateral x-ray will be taken prior to seeing me.    Talia Preciado MD  Professor Orthopedic Surgery  [facility]

## 2023-10-05 ENCOUNTER — THERAPY VISIT (OUTPATIENT)
Dept: PHYSICAL THERAPY | Facility: CLINIC | Age: 28
End: 2023-10-05
Payer: COMMERCIAL

## 2023-10-05 DIAGNOSIS — Z98.890 S/P LIGAMENT REPAIR: Primary | ICD-10-CM

## 2023-10-05 PROCEDURE — 97110 THERAPEUTIC EXERCISES: CPT | Mod: GP | Performed by: PHYSICAL THERAPIST

## 2023-10-05 PROCEDURE — 97112 NEUROMUSCULAR REEDUCATION: CPT | Mod: GP | Performed by: PHYSICAL THERAPIST

## 2023-10-10 ENCOUNTER — THERAPY VISIT (OUTPATIENT)
Dept: PHYSICAL THERAPY | Facility: CLINIC | Age: 28
End: 2023-10-10
Payer: COMMERCIAL

## 2023-10-10 DIAGNOSIS — Z98.890 S/P LIGAMENT REPAIR: Primary | ICD-10-CM

## 2023-10-10 PROCEDURE — 97110 THERAPEUTIC EXERCISES: CPT | Mod: GP | Performed by: PHYSICAL THERAPIST

## 2023-10-10 PROCEDURE — 97112 NEUROMUSCULAR REEDUCATION: CPT | Mod: GP | Performed by: PHYSICAL THERAPIST

## 2023-10-12 ENCOUNTER — THERAPY VISIT (OUTPATIENT)
Dept: PHYSICAL THERAPY | Facility: CLINIC | Age: 28
End: 2023-10-12
Payer: COMMERCIAL

## 2023-10-12 DIAGNOSIS — Z98.890 S/P LIGAMENT REPAIR: Primary | ICD-10-CM

## 2023-10-12 PROCEDURE — 97112 NEUROMUSCULAR REEDUCATION: CPT | Mod: GP | Performed by: PHYSICAL THERAPIST

## 2023-10-12 PROCEDURE — 97110 THERAPEUTIC EXERCISES: CPT | Mod: GP | Performed by: PHYSICAL THERAPIST

## 2023-10-17 ENCOUNTER — THERAPY VISIT (OUTPATIENT)
Dept: PHYSICAL THERAPY | Facility: CLINIC | Age: 28
End: 2023-10-17
Payer: COMMERCIAL

## 2023-10-17 DIAGNOSIS — Z98.890 S/P LIGAMENT REPAIR: Primary | ICD-10-CM

## 2023-10-17 PROCEDURE — 97112 NEUROMUSCULAR REEDUCATION: CPT | Mod: GP | Performed by: PHYSICAL THERAPIST

## 2023-10-17 PROCEDURE — 97110 THERAPEUTIC EXERCISES: CPT | Mod: GP | Performed by: PHYSICAL THERAPIST

## 2023-10-20 ENCOUNTER — THERAPY VISIT (OUTPATIENT)
Dept: PHYSICAL THERAPY | Facility: CLINIC | Age: 28
End: 2023-10-20
Payer: COMMERCIAL

## 2023-10-20 DIAGNOSIS — Z98.890 S/P LIGAMENT REPAIR: Primary | ICD-10-CM

## 2023-10-20 PROCEDURE — 97110 THERAPEUTIC EXERCISES: CPT | Mod: GP | Performed by: PHYSICAL THERAPIST

## 2023-10-24 ENCOUNTER — THERAPY VISIT (OUTPATIENT)
Dept: PHYSICAL THERAPY | Facility: CLINIC | Age: 28
End: 2023-10-24
Payer: COMMERCIAL

## 2023-10-24 DIAGNOSIS — Z98.890 S/P LIGAMENT REPAIR: Primary | ICD-10-CM

## 2023-10-24 DIAGNOSIS — Z98.890 S/P KNEE SURGERY: ICD-10-CM

## 2023-10-24 PROCEDURE — 97110 THERAPEUTIC EXERCISES: CPT | Mod: GP

## 2023-10-27 ENCOUNTER — THERAPY VISIT (OUTPATIENT)
Dept: PHYSICAL THERAPY | Facility: CLINIC | Age: 28
End: 2023-10-27
Payer: COMMERCIAL

## 2023-10-27 DIAGNOSIS — Z98.890 S/P LIGAMENT REPAIR: Primary | ICD-10-CM

## 2023-10-27 PROCEDURE — 97112 NEUROMUSCULAR REEDUCATION: CPT | Mod: GP | Performed by: PHYSICAL THERAPIST

## 2023-10-27 PROCEDURE — 97110 THERAPEUTIC EXERCISES: CPT | Mod: GP | Performed by: PHYSICAL THERAPIST

## 2023-10-31 ENCOUNTER — THERAPY VISIT (OUTPATIENT)
Dept: PHYSICAL THERAPY | Facility: CLINIC | Age: 28
End: 2023-10-31
Payer: COMMERCIAL

## 2023-10-31 DIAGNOSIS — Z98.890 S/P LIGAMENT REPAIR: Primary | ICD-10-CM

## 2023-10-31 DIAGNOSIS — Z98.890 S/P KNEE SURGERY: ICD-10-CM

## 2023-10-31 PROCEDURE — 97110 THERAPEUTIC EXERCISES: CPT | Mod: GP | Performed by: PHYSICAL THERAPIST

## 2023-10-31 NOTE — PROGRESS NOTES
Date   10/31/23 Limb Occlusion Pressure   177 Percent (%) Occlusion   80 Training Occlusion Pressure   142 Exercises Performed   1) SAQ  2) single leg press 1 set w/1pl, progressed to 1.5 pl for set 2-4  Total time under tourniquet   [id number]  Immediate effects   Fatigue Lingering effects (from previous session)   na    NOTES:       Blood Flow Restriction Training: Contraindications and precautions reviewed, pt safe for use of modality, Risks and benefits discussed; pt gave informed consent

## 2023-11-01 DIAGNOSIS — Z98.890 S/P KNEE SURGERY: Primary | ICD-10-CM

## 2023-11-02 ENCOUNTER — THERAPY VISIT (OUTPATIENT)
Dept: PHYSICAL THERAPY | Facility: CLINIC | Age: 28
End: 2023-11-02
Payer: COMMERCIAL

## 2023-11-02 DIAGNOSIS — Z98.890 S/P LIGAMENT REPAIR: Primary | ICD-10-CM

## 2023-11-02 PROCEDURE — 97110 THERAPEUTIC EXERCISES: CPT | Mod: GP | Performed by: PHYSICAL THERAPIST

## 2023-11-02 NOTE — ADDENDUM NOTE
Addendum  created 11/02/23 0640 by Lukas Miles MD    Attestation recorded in Intraprocedure, Flowsheet accepted, Intraprocedure Attestations filed

## 2023-11-02 NOTE — PROGRESS NOTES
Date   11/2/23 Limb Occlusion Pressure   212 Percent (%) Occlusion   80 Training Occlusion Pressure   170 Exercises Performed   1)  squat  2) Single leg press- 2 pl  Total time under tourniquet   7 minutes-1minute-7minutes Immediate effects   fatigue Lingering effects (from previous session)   Mild soreness in back of knee.    NOTES:       Blood Flow Restriction Training: Contraindications and precautions reviewed, pt safe for use of modality, Risks and benefits discussed; pt gave informed consent     Used   Substance Use Topics    Alcohol use: Yes     Comment: occ    Drug use: No     PHYSICAL EXAM     INITIAL VITALS: /78   Pulse 86   Temp 98.4 °F (36.9 °C) (Oral)   Resp 20   Ht 6' (1.829 m)   Wt 230 lb (104.3 kg)   SpO2 100%   BMI 31.19 kg/m²    Physical Exam  Vitals and nursing note reviewed. Constitutional:       General: He is not in acute distress. Appearance: He is well-developed. He is not diaphoretic. HENT:      Head: Normocephalic and atraumatic. Eyes:      Pupils: Pupils are equal, round, and reactive to light. Cardiovascular:      Rate and Rhythm: Normal rate and regular rhythm. Pulmonary:      Effort: Pulmonary effort is normal.      Breath sounds: Normal breath sounds. Abdominal:      General: Bowel sounds are normal.      Palpations: Abdomen is soft. Musculoskeletal:         General: Swelling, tenderness, deformity and signs of injury present. Normal range of motion. Cervical back: Normal range of motion and neck supple. Skin:     General: Skin is warm. Capillary Refill: Capillary refill takes less than 2 seconds. Neurological:      Mental Status: He is alert and oriented to person, place, and time. MEDICAL DECISION MAKING:   Patient is a 49-year-old left-hand-dominant male presented to the emerge part secondary to left hand pain. X-rays obtained confirmed a displaced angulated fifth metacarpal fracture. Placed in a splint after manipulation for reduction limited improvement in alignment, patient discussed with Dr. Elizabeth Orr will see the patient in the office tomorrow. Patient's discharged home with a prescription for Percocet ibuprofen given outpatient follow-up as discussed and parameters to return to the emergency department. All patient's question's and concerns were answered prior to disposition and patient and/or family expressed understanding and agreement of treatment plan.         CRITICAL CARE:              NIH STROKE SCALE: lowest dose possible to manage pain     Dispense:  20 tablet     Refill:  0    ibuprofen (ADVIL;MOTRIN) 800 MG tablet     Sig: Take 1 tablet by mouth 2 times daily as needed for Pain     Dispense:  25 tablet     Refill:  1     CONSULTS:  None    FINAL IMPRESSION      1. Closed boxer's fracture, initial encounter          DISPOSITION/PLAN   DISPOSITION Decision To Discharge 08/30/2021 02:09:19 PM      PATIENT REFERRED TO:  Dylan Wang   [phone]    Schedule an appointment as soon as possible for a visit   Call the office to be seen tomorrow    DISCHARGE MEDICATIONS:  Discharge Medication List as of 8/30/2021  2:11 PM      START taking these medications    Details   oxyCODONE-acetaminophen (PERCOCET) 5-325 MG per tablet Take 1 tablet by mouth every 6 hours as needed for Pain for up to 5 days. Intended supply: 5 days. Take lowest dose possible to manage pain, Disp-20 tablet, R-0Print      ibuprofen (ADVIL;MOTRIN) 800 MG tablet Take 1 tablet by mouth 2 times daily as needed for Pain, Disp-25 tablet, [id number]           Ger Campa MD  Attending Emergency Physician    This note was created with the assistance of a speech-recognition program. While intending to generate a document that actually reflects the content of the visit, no guarantees can be provided that every mistake has been identified and corrected by editing.                     Ger Campa MD  23/78/20 0306

## 2023-11-07 ENCOUNTER — THERAPY VISIT (OUTPATIENT)
Dept: PHYSICAL THERAPY | Facility: CLINIC | Age: 28
End: 2023-11-07
Payer: COMMERCIAL

## 2023-11-07 DIAGNOSIS — Z98.890 S/P LIGAMENT REPAIR: Primary | ICD-10-CM

## 2023-11-07 PROCEDURE — 97110 THERAPEUTIC EXERCISES: CPT | Mod: GP | Performed by: PHYSICAL THERAPIST

## 2023-11-07 NOTE — PROGRESS NOTES
Date   11/7/2023 Limb Occlusion Pressure   180 Percent (%) Occlusion   80% Training Occlusion Pressure   144 Exercises Performed   1.) shallow squats ( too much pain on R side (non surgical); so SAQ with 5lbs  2.) SL leg press  Total time under tourniquet   7-1-7min  Immediate effects   fatigue Lingering effects (from previous session)   No soreness noted    NOTES:       Blood Flow Restriction Training: Contraindications and precautions reviewed, pt safe for use of modality, Risks and benefits discussed; pt gave informed consent

## 2023-11-09 ENCOUNTER — THERAPY VISIT (OUTPATIENT)
Dept: PHYSICAL THERAPY | Facility: CLINIC | Age: 28
End: 2023-11-09
Payer: COMMERCIAL

## 2023-11-09 DIAGNOSIS — Z98.890 S/P LIGAMENT REPAIR: Primary | ICD-10-CM

## 2023-11-09 PROCEDURE — 97112 NEUROMUSCULAR REEDUCATION: CPT | Mod: GP | Performed by: PHYSICAL THERAPIST

## 2023-11-09 PROCEDURE — 97110 THERAPEUTIC EXERCISES: CPT | Mod: GP | Performed by: PHYSICAL THERAPIST

## 2023-11-09 ASSESSMENT — ACTIVITIES OF DAILY LIVING (ADL)
KNEE_ACTIVITY_OF_DAILY_LIVING_SUM: 29
HOW_WOULD_YOU_RATE_THE_OVERALL_FUNCTION_OF_YOUR_KNEE_DURING_YOUR_USUAL_DAILY_ACTIVITIES?: ABNORMAL
HOW_WOULD_YOU_RATE_THE_CURRENT_FUNCTION_OF_YOUR_KNEE_DURING_YOUR_USUAL_DAILY_ACTIVITIES_ON_A_SCALE_FROM_0_TO_100_WITH_100_BEING_YOUR_LEVEL_OF_KNEE_FUNCTION_PRIOR_TO_YOUR_INJURY_AND_0_BEING_THE_INABILITY_TO_PERFORM_ANY_OF_YOUR_USUAL_DAILY_ACTIVITIES?: 40
WEAKNESS: THE SYMPTOM AFFECTS MY ACTIVITY MODERATELY
AS_A_RESULT_OF_YOUR_KNEE_INJURY,_HOW_WOULD_YOU_RATE_YOUR_CURRENT_LEVEL_OF_DAILY_ACTIVITY?: ABNORMAL
WALK: ACTIVITY IS FAIRLY DIFFICULT
SIT WITH YOUR KNEE BENT: ACTIVITY IS SOMEWHAT DIFFICULT
GIVING WAY, BUCKLING OR SHIFTING OF KNEE: THE SYMPTOM AFFECTS MY ACTIVITY MODERATELY
SQUAT: ACTIVITY IS FAIRLY DIFFICULT
STAND: ACTIVITY IS FAIRLY DIFFICULT
KNEE_ACTIVITY_OF_DAILY_LIVING_SCORE: 41.43
KNEEL ON THE FRONT OF YOUR KNEE: ACTIVITY IS VERY DIFFICULT
GO UP STAIRS: ACTIVITY IS FAIRLY DIFFICULT
LIMPING: THE SYMPTOM AFFECTS MY ACTIVITY MODERATELY
PAIN: THE SYMPTOM AFFECTS MY ACTIVITY MODERATELY
RISE FROM A CHAIR: ACTIVITY IS SOMEWHAT DIFFICULT
STIFFNESS: THE SYMPTOM AFFECTS MY ACTIVITY MODERATELY
SWELLING: THE SYMPTOM AFFECTS MY ACTIVITY MODERATELY
GO DOWN STAIRS: ACTIVITY IS FAIRLY DIFFICULT
RAW_SCORE: 29

## 2023-11-09 NOTE — PROGRESS NOTES
Date   11/9/23 Limb Occlusion Pressure   203 Percent (%) Occlusion   80 Training Occlusion Pressure   162 Exercises Performed   1) DL squat w/weight shifted to L   2)  SL press w/2 pl Total time under tourniquet   7 minutes-1minute-7minutes  Immediate effects   Fatigue  Lingering effects (from previous session)   No soreness noted    NOTES:       Blood Flow Restriction Training: Contraindications and precautions reviewed, pt safe for use of modality, Risks and benefits discussed; pt gave informed consent

## 2023-11-14 ENCOUNTER — ANCILLARY PROCEDURE (OUTPATIENT)
Dept: GENERAL RADIOLOGY | Facility: CLINIC | Age: 28
End: 2023-11-14
Attending: ORTHOPAEDIC SURGERY
Payer: COMMERCIAL

## 2023-11-14 ENCOUNTER — OFFICE VISIT (OUTPATIENT)
Dept: ORTHOPEDICS | Facility: CLINIC | Age: 28
End: 2023-11-14
Payer: COMMERCIAL

## 2023-11-14 ENCOUNTER — THERAPY VISIT (OUTPATIENT)
Dept: PHYSICAL THERAPY | Facility: CLINIC | Age: 28
End: 2023-11-14
Payer: COMMERCIAL

## 2023-11-14 DIAGNOSIS — Z98.890 S/P KNEE SURGERY: ICD-10-CM

## 2023-11-14 DIAGNOSIS — Z98.890 S/P LIGAMENT REPAIR: Primary | ICD-10-CM

## 2023-11-14 DIAGNOSIS — Z98.890 S/P KNEE SURGERY: Primary | ICD-10-CM

## 2023-11-14 PROCEDURE — 97112 NEUROMUSCULAR REEDUCATION: CPT | Mod: GP | Performed by: PHYSICAL THERAPIST

## 2023-11-14 PROCEDURE — 97110 THERAPEUTIC EXERCISES: CPT | Mod: GP | Performed by: PHYSICAL THERAPIST

## 2023-11-14 PROCEDURE — 99024 POSTOP FOLLOW-UP VISIT: CPT | Performed by: ORTHOPAEDIC SURGERY

## 2023-11-14 PROCEDURE — 73560 X-RAY EXAM OF KNEE 1 OR 2: CPT | Mod: LT | Performed by: RADIOLOGY

## 2023-11-14 NOTE — NURSING NOTE
Reason For Visit:   Chief Complaint   Patient presents with    Surgical Followup     DOS: 9/7/23 Left // Knee Arthroscopy, Medial Patello-femoral Ligament Reconstruction with Allograft, carlitos-medial plus distal Tibial Tubercle Osteotomy, lateral retinacular lengthening with partial lateral facetectomy       Primary MD: [place]  Ref. MD: EST     ?  No  Currently working? no  Work status?  medical leave.     Date of surgery: 9/7/23   Type of surgery: Left Knee Arthroscopy, Medial Patello-femoral Ligament Reconstruction with Allograft, carlitos-medial Tibial Tubercle Osteotomy, lateral retinacular lengthening with partial lateral facetectomy      Smoker: No  Request smoking cessation information: No    There were no vitals taken for this visit.    Pain Assessment  Patient Currently in Pain: Yes  0-10 Pain Scale: 0  Primary Pain Location: Knee (Left)

## 2023-11-14 NOTE — LETTER
11/14/2023         RE: Tiffanie Restrepo  [address] [place] [address]        Dear Colleague,    Thank you for referring your patient, Tiffanie Restrepo, to the [facility] [place]. Please see a copy of my visit note below.    Patient is a 28-year-old female who is approximately 9 weeks status post MPFL reconstruction, distal TTO, LRL with partial lateral facetectomy.  She had a fair amount of anxiety and pain postop but this has quieted down considerably.  She currently reports a pain scale of 0.  She does have difficulty with engaging her quad and this is her biggest problem.  She is involved in physical therapy to which she has been faithful.    She had 2 jobs prior to surgery neither of which she will go back to directly.  She was a  and will likely  work serving once she feels she is ready for it.  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    She continues to use the brace unlocked.  She is not using crutches.  She was undergoing NMES at her physical therapy.  She has just started BFR and believes that she is in her third week of that.  She does think that her quad is getting better doing the BFR.    Physical exam of patient's left knee reveals benign-appearing incision good quad contraction.  Inability to do a straight leg raising effort secondary to pain.  From the bent knee position she can straighten out her leg with less pain but has a 30 degree lag.    Passive range of motion is 0-1 15.  Passive patella mobility is 2 quadrants lateral mobility with a firm endpoint    Imaging: Sagittal x-ray shows complete healing of the posterior aspect of the TTO bone fragment with near complete healing in the distal osteotomy site.    Assessment: Good motion and fair strength at this point in time postop  Satisfactory healing.    Plan: I believe that the patient should do some trials of  walking with a knee sleeve around short distances in the house.  If she feels that she is not comfortable with this due to fear of falling she should use a single crutch.    Outdoors she should continue to use 2 crutches with her brace open or no crutches with the brace locked, depending on the weather conditions as well as a distance walking    She will continue to work with physical therapy.    We will continue to keep tabs through through her physical therapy.  Follow-up in 6 weeks time.  No new x-rays need to be done    Talia Preciado MD  Professor Orthopedic Surgery  [facility]

## 2023-11-14 NOTE — PROGRESS NOTES
Date   11/14/23 Limb Occlusion Pressure   206 Percent (%) Occlusion   80 Training Occlusion Pressure   165 Exercises Performed   1) Squat  2) SL press  Total time under tourniquet   7 minutes-1minute- 7minutes  Immediate effects   Fatigue Lingering effects (from previous session)   No soreness    NOTES:       Blood Flow Restriction Training: Contraindications and precautions reviewed, pt safe for use of modality, Risks and benefits discussed; pt gave informed consent

## 2023-11-15 NOTE — PROGRESS NOTES
Patient is a 28-year-old female who is approximately 9 weeks status post MPFL reconstruction, distal TTO, LRL with partial lateral facetectomy.  She had a fair amount of anxiety and pain postop but this has quieted down considerably.  She currently reports a pain scale of 0.  She does have difficulty with engaging her quad and this is her biggest problem.  She is involved in physical therapy to which she has been faithful.    She had 2 jobs prior to surgery neither of which she will go back to directly.  She was a  and will likely  work serving once she feels she is ready for it.  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    She continues to use the brace unlocked.  She is not using crutches.  She was undergoing NMES at her physical therapy.  She has just started BFR and believes that she is in her third week of that.  She does think that her quad is getting better doing the BFR.    Physical exam of patient's left knee reveals benign-appearing incision good quad contraction.  Inability to do a straight leg raising effort secondary to pain.  From the bent knee position she can straighten out her leg with less pain but has a 30 degree lag.    Passive range of motion is 0-1 15.  Passive patella mobility is 2 quadrants lateral mobility with a firm endpoint    Imaging: Sagittal x-ray shows complete healing of the posterior aspect of the TTO bone fragment with near complete healing in the distal osteotomy site.    Assessment: Good motion and fair strength at this point in time postop  Satisfactory healing.    Plan: I believe that the patient should do some trials of walking with a knee sleeve around short distances in the house.  If she feels that she is not comfortable with this due to fear of falling she should use a single crutch.    Outdoors she should continue to use 2 crutches with her brace open or no crutches with the brace  locked, depending on the weather conditions as well as a distance walking    She will continue to work with physical therapy.    We will continue to keep tabs through through her physical therapy.  Follow-up in 6 weeks time.  No new x-rays need to be done    Talia Preciado MD  Professor Orthopedic Surgery  [facility]

## 2023-11-16 ENCOUNTER — THERAPY VISIT (OUTPATIENT)
Dept: PHYSICAL THERAPY | Facility: CLINIC | Age: 28
End: 2023-11-16
Payer: COMMERCIAL

## 2023-11-16 DIAGNOSIS — Z98.890 S/P LIGAMENT REPAIR: Primary | ICD-10-CM

## 2023-11-16 DIAGNOSIS — Z98.890 S/P KNEE SURGERY: ICD-10-CM

## 2023-11-16 PROCEDURE — 97110 THERAPEUTIC EXERCISES: CPT | Mod: GP | Performed by: PHYSICAL THERAPIST

## 2023-11-16 NOTE — PROGRESS NOTES
Date   11/16/23 Limb Occlusion Pressure   164 Percent (%) Occlusion   80 Training Occlusion Pressure   131 Exercises Performed   1) squat  2)SL press  Total time under tourniquet   7 minutes- 1 minute- 7minutes Immediate effects   fatigue Lingering effects (from previous session)   Soreness, mild pain    NOTES:       Blood Flow Restriction Training: Contraindications and precautions reviewed, pt safe for use of modality, Risks and benefits discussed; pt gave informed consent

## 2023-11-21 ENCOUNTER — THERAPY VISIT (OUTPATIENT)
Dept: PHYSICAL THERAPY | Facility: CLINIC | Age: 28
End: 2023-11-21
Payer: COMMERCIAL

## 2023-11-21 DIAGNOSIS — Z98.890 S/P LIGAMENT REPAIR: Primary | ICD-10-CM

## 2023-11-21 PROCEDURE — 97110 THERAPEUTIC EXERCISES: CPT | Mod: GP | Performed by: PHYSICAL THERAPIST

## 2023-11-30 ENCOUNTER — THERAPY VISIT (OUTPATIENT)
Dept: PHYSICAL THERAPY | Facility: CLINIC | Age: 28
End: 2023-11-30
Payer: COMMERCIAL

## 2023-11-30 DIAGNOSIS — Z98.890 S/P LIGAMENT REPAIR: Primary | ICD-10-CM

## 2023-11-30 PROCEDURE — 97110 THERAPEUTIC EXERCISES: CPT | Mod: GP | Performed by: PHYSICAL THERAPIST

## 2023-11-30 NOTE — PROGRESS NOTES
Date   11/30/23 Limb Occlusion Pressure   175 Percent (%) Occlusion   80 Training Occlusion Pressure   140 Exercises Performed   1) SL press 2 sets w/2pl, 2 set w/2.5 pl last 30 sec w/3.5 pl   2) SL squat   Total time under tourniquet   7 minutes- 1 minute-7 minutes  Immediate effects   Fatigue  Lingering effects (from previous session)   None    NOTES:       Blood Flow Restriction Training: Contraindications and precautions reviewed, pt safe for use of modality, Risks and benefits discussed; pt gave informed consent

## 2023-12-05 ENCOUNTER — THERAPY VISIT (OUTPATIENT)
Dept: PHYSICAL THERAPY | Facility: CLINIC | Age: 28
End: 2023-12-05
Payer: COMMERCIAL

## 2023-12-05 DIAGNOSIS — Z98.890 S/P LIGAMENT REPAIR: Primary | ICD-10-CM

## 2023-12-05 PROCEDURE — 97110 THERAPEUTIC EXERCISES: CPT | Mod: GP | Performed by: PHYSICAL THERAPIST

## 2023-12-05 NOTE — PROGRESS NOTES
Date   12/5/23 Limb Occlusion Pressure   207 Percent (%) Occlusion   80 Training Occlusion Pressure   166 Exercises Performed   1) SL press w/2.5 pl   2) Squat with wt shifted to L  Total time under tourniquet   7 min-1 min-7 min  Immediate effects   fatigue Lingering effects (from previous session)   none    NOTES:       Blood Flow Restriction Training: Contraindications and precautions reviewed, pt safe for use of modality, Risks and benefits discussed; pt gave informed consent

## 2023-12-05 NOTE — PROGRESS NOTES
12/05/23 0500   Appointment Info   Signing clinician's name / credentials Kenia Lopez, PT, SCS; Julia Marie, SPT   Total/Authorized Visits E&T   Visits Used 21   Medical Diagnosis L knee post op: MPFL reconstruction, Tibial Tub osteotomy   PT Tx Diagnosis L knee decreased ROM, and strength   Precautions/Limitations see MD protocols   Other pertinent information 8th BFR visit- SL press first, then squat w/BFR   Quick Adds Certification   Progress Note/Certification   Start of Care Date 09/14/23   Onset of illness/injury or Date of Surgery 09/07/23   Therapy Frequency 1X/week   Predicted Duration 12 weeks   Certification date from 12/07/23   Certification date to 02/27/24   Progress Note Completed Date 10/20/23   GOALS   PT Goals 2   PT Goal 1   Goal Identifier Goal 1   Goal Description Pt will be able to go up and down stairs, without a railing, painfree   Rationale to maximize safety and independence within the home   Goal Progress improving, still using railing/assistance   Target Date 02/27/24   PT Goal 2   Goal Identifier Goal 2   Goal Description Pt wll be able to walk, without AD, household distance, with no pain   Rationale to maximize safety and independence with performance of ADLs and functional tasks;to maximize safety and independence within the home   Goal Progress Pt ambulates w/brace on and no crutches   Target Date 02/27/24   Subjective Report   Subjective Report Pt has slightly more swelling after being on feet a lot yesterday. Pt doing stairs more normally.   Objective Measures   Objective Measures Objective Measure 1;Objective Measure 2   Objective Measure 1   Objective Measure AROM   Details 126 flexion   Objective Measure 2   Objective Measure mid patella   Details L= 42 cm   Objective Measure 3   Objective Measure Quad activation   Details good on left, good superior migration of patella   Treatment Interventions (PT)   Interventions Therapeutic Procedure/Exercise   Therapeutic  Procedure/Exercise   Therapeutic Procedures: strength, endurance, ROM, flexibillity minutes (51766) 40   Therapeutic Procedures Ther Proc 2;Ther Proc 3;Ther Proc 4;Ther Proc 5   Ther Proc 1 Pt education   Ther Proc 1 - Details POC, trying ellipitcal and rowing machine at gym   Ther Proc 2 bike seat 11   Ther Proc 2 - Details 5 mins   Ther Proc 3 SL deadlift with 10# KB   Ther Proc 3 - Details X10 each side   Ther Proc 4 Squat weight shifted to L   Ther Proc 4 - Details w/BFR, last set SL at rail   Ther Proc 5 SL leg press: seat 3   Ther Proc 5 - Details w/BFR w/2.5 pl   PTRx Ther Proc 2 Side stepping   PTRx Ther Proc 2 - Details w/RTB 3 sets x 8 steps each direction   PTRx Ther Proc 3 Eccentric SLR w/PT assist   PTRx Ther Proc 3 - Details x5   Skilled Intervention instructed on exercsies to help reduce pain and increase motion   Education   Learner/Method Patient;Pictures/Video   Education Comments education on post op protocol, on restrictions   Plan   Home program See PTRx   Total Session Time   Timed Code Treatment Minutes 40   Total Treatment Time (sum of timed and untimed services) 40         [facility]                                                                                   OUTPATIENT PHYSICAL THERAPY    PLAN OF TREATMENT FOR OUTPATIENT REHABILITATION   Patient's Last Name, First Name, TREMAINEDANIEL RoTiffanie werner YOB: 1995   Provider's Name   [facility]   Medical Record No.  [id number]     Onset Date: 09/07/23  Start of Care Date: 09/14/23     Medical Diagnosis:  L knee post op: MPFL reconstruction, Tibial Tub osteotomy      PT Treatment Diagnosis:  L knee decreased ROM, and strength Plan of Treatment  Frequency/Duration: 1X/week/ 12 weeks    Certification date from (P) 12/07/23 to (P) 02/27/24         See note for plan of treatment details and functional goals     Kenia Lopez, PT                         I CERTIFY THE NEED FOR  THESE SERVICES FURNISHED UNDER        THIS PLAN OF TREATMENT AND WHILE UNDER MY CARE     (Physician attestation of this document indicates review and certification of the therapy plan).              Referring Provider:  Talia Preciado    Initial Assessment  See Epic Evaluation- Start of Care Date: 09/14/23

## 2023-12-07 ENCOUNTER — THERAPY VISIT (OUTPATIENT)
Dept: PHYSICAL THERAPY | Facility: CLINIC | Age: 28
End: 2023-12-07
Payer: COMMERCIAL

## 2023-12-07 DIAGNOSIS — Z98.890 S/P LIGAMENT REPAIR: Primary | ICD-10-CM

## 2023-12-07 PROCEDURE — 97110 THERAPEUTIC EXERCISES: CPT | Mod: GP | Performed by: PHYSICAL THERAPIST

## 2023-12-07 NOTE — PROGRESS NOTES
Date   12/7/23 Limb Occlusion Pressure   183 Percent (%) Occlusion   80 Training Occlusion Pressure   146 Exercises Performed   1) SL press w/2.5 pl   2)  SL squat at bar Total time under tourniquet   7 mins-1mins-7mins  Immediate effects   Fatigue, some knee soreness Lingering effects (from previous session)   Mild soreness    NOTES:       Blood Flow Restriction Training: Contraindications and precautions reviewed, pt safe for use of modality, Risks and benefits discussed; pt gave informed consent

## 2023-12-12 ENCOUNTER — THERAPY VISIT (OUTPATIENT)
Dept: PHYSICAL THERAPY | Facility: CLINIC | Age: 28
End: 2023-12-12
Payer: COMMERCIAL

## 2023-12-12 DIAGNOSIS — Z98.890 S/P LIGAMENT REPAIR: Primary | ICD-10-CM

## 2023-12-12 PROCEDURE — 97112 NEUROMUSCULAR REEDUCATION: CPT | Mod: GP | Performed by: PHYSICAL THERAPIST

## 2023-12-12 PROCEDURE — 97110 THERAPEUTIC EXERCISES: CPT | Mod: GP | Performed by: PHYSICAL THERAPIST

## 2023-12-12 NOTE — PROGRESS NOTES
Date   12/12/23 Limb Occlusion Pressure   184 Percent (%) Occlusion   80 Training Occlusion Pressure   147 Exercises Performed   1) SL press (3 pl)  2) SL leg squat at rail  Total time under tourniquet   7 mins- 1 min- 7 mins  Immediate effects   fatigue Lingering effects (from previous session)   None    NOTES:       Blood Flow Restriction Training: Contraindications and precautions reviewed, pt safe for use of modality, Risks and benefits discussed; pt gave informed consent

## 2023-12-14 ENCOUNTER — THERAPY VISIT (OUTPATIENT)
Dept: PHYSICAL THERAPY | Facility: CLINIC | Age: 28
End: 2023-12-14
Payer: COMMERCIAL

## 2023-12-14 DIAGNOSIS — Z98.890 S/P LIGAMENT REPAIR: Primary | ICD-10-CM

## 2023-12-14 DIAGNOSIS — Z98.890 S/P KNEE SURGERY: ICD-10-CM

## 2023-12-14 PROCEDURE — 97110 THERAPEUTIC EXERCISES: CPT | Mod: GP | Performed by: PHYSICAL THERAPIST

## 2023-12-14 PROCEDURE — 97112 NEUROMUSCULAR REEDUCATION: CPT | Mod: GP | Performed by: PHYSICAL THERAPIST

## 2023-12-14 NOTE — PROGRESS NOTES
Date   12/14   Limb Occlusion Pressure   180 Percent (%) Occlusion   80 Training Occlusion Pressure   144 Exercises Performed   1) SL press w/3pl  2)  SL squat at rail Total time under tourniquet   7 min- 1min- 7min  Immediate effects   fatigue Lingering effects (from previous session)   none    NOTES:       Blood Flow Restriction Training: Contraindications and precautions reviewed, pt safe for use of modality, Risks and benefits discussed; pt gave informed consent

## 2023-12-17 ENCOUNTER — HEALTH MAINTENANCE LETTER (OUTPATIENT)
Age: 28
End: 2023-12-17

## 2023-12-19 ENCOUNTER — THERAPY VISIT (OUTPATIENT)
Dept: PHYSICAL THERAPY | Facility: CLINIC | Age: 28
End: 2023-12-19
Payer: COMMERCIAL

## 2023-12-19 DIAGNOSIS — Z98.890 S/P LIGAMENT REPAIR: Primary | ICD-10-CM

## 2023-12-19 PROCEDURE — 97110 THERAPEUTIC EXERCISES: CPT | Mod: GP | Performed by: PHYSICAL THERAPIST

## 2023-12-19 ASSESSMENT — ACTIVITIES OF DAILY LIVING (ADL)
KNEEL ON THE FRONT OF YOUR KNEE: I AM UNABLE TO DO THE ACTIVITY
KNEE_ACTIVITY_OF_DAILY_LIVING_SUM: 29
GIVING WAY, BUCKLING OR SHIFTING OF KNEE: THE SYMPTOM AFFECTS MY ACTIVITY MODERATELY
GO UP STAIRS: ACTIVITY IS VERY DIFFICULT
WALK: ACTIVITY IS FAIRLY DIFFICULT
SWELLING: THE SYMPTOM AFFECTS MY ACTIVITY MODERATELY
HOW_WOULD_YOU_RATE_THE_OVERALL_FUNCTION_OF_YOUR_KNEE_DURING_YOUR_USUAL_DAILY_ACTIVITIES?: ABNORMAL
SQUAT: ACTIVITY IS FAIRLY DIFFICULT
AS_A_RESULT_OF_YOUR_KNEE_INJURY,_HOW_WOULD_YOU_RATE_YOUR_CURRENT_LEVEL_OF_DAILY_ACTIVITY?: ABNORMAL
PAIN: THE SYMPTOM AFFECTS MY ACTIVITY MODERATELY
STAND: ACTIVITY IS SOMEWHAT DIFFICULT
RISE FROM A CHAIR: ACTIVITY IS SOMEWHAT DIFFICULT
HOW_WOULD_YOU_RATE_THE_CURRENT_FUNCTION_OF_YOUR_KNEE_DURING_YOUR_USUAL_DAILY_ACTIVITIES_ON_A_SCALE_FROM_0_TO_100_WITH_100_BEING_YOUR_LEVEL_OF_KNEE_FUNCTION_PRIOR_TO_YOUR_INJURY_AND_0_BEING_THE_INABILITY_TO_PERFORM_ANY_OF_YOUR_USUAL_DAILY_ACTIVITIES?: 45
LIMPING: THE SYMPTOM AFFECTS MY ACTIVITY MODERATELY
STIFFNESS: THE SYMPTOM AFFECTS MY ACTIVITY MODERATELY
SIT WITH YOUR KNEE BENT: ACTIVITY IS MINIMALLY DIFFICULT
RAW_SCORE: 29
WEAKNESS: THE SYMPTOM AFFECTS MY ACTIVITY MODERATELY
GO DOWN STAIRS: ACTIVITY IS FAIRLY DIFFICULT
KNEE_ACTIVITY_OF_DAILY_LIVING_SCORE: 41.43

## 2023-12-19 NOTE — PROGRESS NOTES
Date   12/19/23 Limb Occlusion Pressure   *** Percent (%) Occlusion   80 Training Occlusion Pressure   *** Exercises Performed   1) SL press  2) SL squat   Total time under tourniquet   7 mins-1 min-7mins  Immediate effects   fatigue Lingering effects (from previous session)   none    NOTES:       Blood Flow Restriction Training: Contraindications and precautions reviewed, pt safe for use of modality, Risks and benefits discussed; pt gave informed consent

## 2023-12-19 NOTE — PROGRESS NOTES
12/19/23 0500   Appointment Info   Signing clinician's name / credentials Kenia Lopez, PT, SCS; Julia Marie, SPT   Total/Authorized Visits E&T   Visits Used 25   Medical Diagnosis L knee post op: MPFL reconstruction, Tibial Tub osteotomy   PT Tx Diagnosis L knee decreased ROM, and strength   Precautions/Limitations see MD protocols   Other pertinent information 11th BFR visit- Tindeq next week   Quick Adds Certification   Progress Note/Certification   Start of Care Date 09/14/23   Onset of illness/injury or Date of Surgery 09/07/23   Therapy Frequency 1X/week   Predicted Duration 12 weeks   Certification date from 12/07/23   Certification date to 02/27/24   Progress Note Completed Date 10/20/23   GOALS   PT Goals 2   PT Goal 1   Goal Identifier Goal 1   Goal Description Pt will be able to go up and down stairs, without a railing, painfree   Rationale to maximize safety and independence within the home   Goal Progress improving, still using railing/assistance   Target Date 02/27/24   PT Goal 2   Goal Identifier Goal 2   Goal Description Pt wll be able to walk, without AD, household distance, with no pain   Rationale to maximize safety and independence with performance of ADLs and functional tasks;to maximize safety and independence within the home   Goal Progress Pt ambulates w/brace on and no crutches   Target Date 02/27/24   Subjective Report   Subjective Report Pt reports cracking in knee joint sometimes. Pt hopes to be able to ski at the end of March. Pt goes to gym 2 days a week, 1 leg day outside of clinic.   Objective Measures   Objective Measures Objective Measure 1;Objective Measure 2;Objective Measure 3;Objective Measure 4;Objective Measure 5   Objective Measure 1   Objective Measure AROM   Details L: 0-0-142   Objective Measure 2   Objective Measure mid patella   Details L= 42cm   R=40cm   Objective Measure 3   Objective Measure Quad activation   Details Good activation bilatearlly; good superior  translation of patella bilaterally   Objective Measure 4   Objective Measure Quad strength: Tindeq   Details R: 128.3 lbs L: 38.2; 30% strength on L side with pain   Objective Measure 5   Objective Measure Quad girth- 10 cm above patella   Details R: 54 cm L: 50cm   Treatment Interventions (PT)   Interventions Therapeutic Procedure/Exercise   Therapeutic Procedure/Exercise   Therapeutic Procedures: strength, endurance, ROM, flexibillity minutes (03860) 40   Therapeutic Procedures Ther Proc 2;Ther Proc 3;Ther Proc 4;Ther Proc 5   Ther Proc 1 Pt education   Ther Proc 1 - Details POC, gym routine, swelling, pain inhibition   Ther Proc 2 quad strength testing with Tindeq   Ther Proc 2 - Details Seated at end of plinth   Skilled Intervention instructed on exercsies to help reduce pain and increase motion   Patient Response/Progress Pt tolerated well   Neuromuscular Re-education   Skilled Intervention instructed on balance to improve knee stability   Education   Learner/Method Patient;Pictures/Video   Education Comments education on post op protocol, on restrictions   Plan   Home program See PTRx   Total Session Time   Timed Code Treatment Minutes 40   Total Treatment Time (sum of timed and untimed services) 40         PLAN  Continue therapy per current plan of care.     Beginning/End Dates of Progress Note Reporting Period:  10/20/23 to 12/19/2023    Referring Provider:  Talia Preciado

## 2023-12-26 ENCOUNTER — ANCILLARY PROCEDURE (OUTPATIENT)
Dept: GENERAL RADIOLOGY | Facility: CLINIC | Age: 28
End: 2023-12-26
Attending: ORTHOPAEDIC SURGERY
Payer: COMMERCIAL

## 2023-12-26 ENCOUNTER — OFFICE VISIT (OUTPATIENT)
Dept: ORTHOPEDICS | Facility: CLINIC | Age: 28
End: 2023-12-26
Payer: COMMERCIAL

## 2023-12-26 DIAGNOSIS — Z98.890 S/P KNEE SURGERY: ICD-10-CM

## 2023-12-26 DIAGNOSIS — Z98.890 S/P KNEE SURGERY: Primary | ICD-10-CM

## 2023-12-26 PROCEDURE — 73560 X-RAY EXAM OF KNEE 1 OR 2: CPT | Mod: LT | Performed by: RADIOLOGY

## 2023-12-26 PROCEDURE — 99213 OFFICE O/P EST LOW 20 MIN: CPT | Performed by: ORTHOPAEDIC SURGERY

## 2023-12-26 NOTE — LETTER
12/26/2023         RE: Tiffanie Restrepo  [id number] [place] [address]        Dear Colleague,    Thank you for referring your patient, Tiffanie Restrepo, to the [facility] [place]. Please see a copy of my visit note below.    Patient is a 28-year-old female who is now approximately 3-1/2-month status post a left MPFL reconstruction, anteromedial tibial tubercle osteotomy, LRL and partial lateral facetectomy.    She had significant pain issues immediately postop.  Once those abated we had problems getting back her motion.  When I saw her 6 weeks ago her motion was 115 degrees.  She now feels that her motion is back to being close to the opposite side    Her biggest hindrance has been her quad function.  She still has a difficult time executing a supine straight leg raising effort.  However looking back on her previous notes that she had a 15 degree lag 6 weeks ago from a bent knee position and could not execute a straight leg raising effort from the supine position.    This said she continues to struggle with generalized discomfort around her knee and lack of quad strength.  She recently instituted blood flow restriction with her physical therapist Lesly, and continues with that physical therapy adjuvant.      What is most disconcerting to her is that she wanted to go [place] west in [place].    Physical exam of patient's left knee reveals benign-appearing incision.  Good quad contraction with fair ability to do a straight leg raise.  She is not able to hold this against gravity.  She reports pain when she does this.  From the 90 degree position she has approximately a 10 degree lag but with effort can reduce it to near 0.  Passive range of motion 0-1 36.  Passive patella mobility 2 quadrants lateral mobility with a firm endpoint no crepitus to active range of motion.    Imaging: A single lateral x-ray was taken of her left knee and it reveals complete healing of the  tibial tubercle osteotomy site    Assessment: Patient has poor return of quad function but she is better than she was 6 weeks ago.  We struggled with motion over the last 6 weeks and she has now regained her motion.  She actually has improved quad strength compared to 6 weeks ago with less of a lag.  She can do a straight leg raising effort in a stand-up position.    I am not sure that she is going to be ready for skiing out west in March but I do believe the next 6 weeks will be most telling about return of quad function as we do not any longer need to worry about bone healing and or motion issues.  I trust that her and her therapist can make an informed decision about her quad strength prior to that anticipated trip.    Follow-up in April.  Functional test will be done prior to seeing me.    Talia Preciado MD  Professor Orthopedic Surgery  [facility]

## 2023-12-26 NOTE — PROGRESS NOTES
LVM for pt to return call re: confirming upcoming COLONOSCOPY w/ Dr Kell Tam at [facility] scheduled for 2/10/2023 Patient is a 28-year-old female who is now approximately 3-1/2-month status post a left MPFL reconstruction, anteromedial tibial tubercle osteotomy, LRL and partial lateral facetectomy.    She had significant pain issues immediately postop.  Once those abated we had problems getting back her motion.  When I saw her 6 weeks ago her motion was 115 degrees.  She now feels that her motion is back to being close to the opposite side    Her biggest hindrance has been her quad function.  She still has a difficult time executing a supine straight leg raising effort.  However looking back on her previous notes that she had a 15 degree lag 6 weeks ago from a bent knee position and could not execute a straight leg raising effort from the supine position.    This said she continues to struggle with generalized discomfort around her knee and lack of quad strength.  She recently instituted blood flow restriction with her physical therapist Lesly, and continues with that physical therapy adjuvant.      What is most disconcerting to her is that she wanted to go skiing out west in March.    Physical exam of patient's left knee reveals benign-appearing incision.  Good quad contraction with fair ability to do a straight leg raise.  She is not able to hold this against gravity.  She reports pain when she does this.  From the 90 degree position she has approximately a 10 degree lag but with effort can reduce it to near 0.  Passive range of motion 0-1 36.  Passive patella mobility 2 quadrants lateral mobility with a firm endpoint no crepitus to active range of motion.    Imaging: A single lateral x-ray was taken of her left knee and it reveals complete healing of the tibial tubercle osteotomy site    Assessment: Patient has poor return of quad function but she is better than she was 6 weeks ago.  We struggled with motion over the last 6 weeks and she has now regained her motion.  She actually has improved quad strength compared to 6 weeks  ago with less of a lag.  She can do a straight leg raising effort in a stand-up position.    I am not sure that she is going to be ready for skiing out west in March but I do believe the next 6 weeks will be most telling about return of quad function as we do not any longer need to worry about bone healing and or motion issues.  I trust that her and her therapist can make an informed decision about her quad strength prior to that anticipated trip.    Follow-up in April.  Functional test will be done prior to seeing me.    Talia Preciado MD  Professor Orthopedic Surgery  [facility]

## 2023-12-26 NOTE — NURSING NOTE
Reason For Visit:   Chief Complaint   Patient presents with    Surgical Followup     DOS: 9/7/23 Left // Knee Arthroscopy, Medial Patello-femoral Ligament Reconstruction with Allograft, carlitos-medial plus distal Tibial Tubercle Osteotomy, lateral retinacular lengthening with partial lateral facetectomy       Primary MD: [place]  Ref. MD: EST     ?  No  Currently working? no  Work status?  medical leave.     Date of surgery: 9/7/23   Type of surgery: Left Knee Arthroscopy, Medial Patello-femoral Ligament Reconstruction with Allograft, carlitos-medial Tibial Tubercle Osteotomy, lateral retinacular lengthening with partial lateral facetectomy      Smoker: No  Request smoking cessation information: No    There were no vitals taken for this visit.    Pain Assessment  Patient Currently in Pain: Denies

## 2023-12-28 ENCOUNTER — THERAPY VISIT (OUTPATIENT)
Dept: PHYSICAL THERAPY | Facility: CLINIC | Age: 28
End: 2023-12-28
Payer: COMMERCIAL

## 2023-12-28 DIAGNOSIS — Z98.890 S/P LIGAMENT REPAIR: Primary | ICD-10-CM

## 2023-12-28 PROCEDURE — 97110 THERAPEUTIC EXERCISES: CPT | Mod: GP | Performed by: PHYSICAL THERAPIST

## 2023-12-28 NOTE — PROGRESS NOTES
Date   12/28/2023 Limb Occlusion Pressure   161 Percent (%) Occlusion   80 Training Occlusion Pressure   129 Exercises Performed   1.) SL Leg press: 3pl  2.) SL squats (assisted at bar)    Total time under tourniquet   7-1-7 min  Immediate effects   fatigue     NOTES:       Blood Flow Restriction Training: Contraindications and precautions reviewed, pt safe for use of modality, Risks and benefits discussed; pt gave informed consent

## 2024-01-02 ENCOUNTER — THERAPY VISIT (OUTPATIENT)
Dept: PHYSICAL THERAPY | Facility: CLINIC | Age: 29
End: 2024-01-02
Payer: COMMERCIAL

## 2024-01-02 DIAGNOSIS — Z98.890 S/P LIGAMENT REPAIR: Primary | ICD-10-CM

## 2024-01-02 PROCEDURE — 97110 THERAPEUTIC EXERCISES: CPT | Mod: GP | Performed by: PHYSICAL THERAPIST

## 2024-01-02 PROCEDURE — 97112 NEUROMUSCULAR REEDUCATION: CPT | Mod: GP | Performed by: PHYSICAL THERAPIST

## 2024-01-02 NOTE — PROGRESS NOTES
Date   1/2/2024 Limb Occlusion Pressure   161 Percent (%) Occlusion   80 Training Occlusion Pressure   129 Exercises Performed   1.) SL leg press (3.0pl)  2.)  SL squat (at sink) Total time under tourniquet   7-1-7  Immediate effects   fatigue Lingering effects (from previous session)   None noted    NOTES:       Blood Flow Restriction Training: Contraindications and precautions reviewed, pt safe for use of modality, Risks and benefits discussed; pt gave informed consent

## 2024-01-04 ENCOUNTER — THERAPY VISIT (OUTPATIENT)
Dept: PHYSICAL THERAPY | Facility: CLINIC | Age: 29
End: 2024-01-04
Payer: COMMERCIAL

## 2024-01-04 DIAGNOSIS — Z98.890 S/P LIGAMENT REPAIR: Primary | ICD-10-CM

## 2024-01-04 PROCEDURE — 97110 THERAPEUTIC EXERCISES: CPT | Mod: GP | Performed by: PHYSICAL THERAPIST

## 2024-01-04 PROCEDURE — 97112 NEUROMUSCULAR REEDUCATION: CPT | Mod: GP | Performed by: PHYSICAL THERAPIST

## 2024-01-04 NOTE — PROGRESS NOTES
Date   1/4/2024 Limb Occlusion Pressure: 189 Percent (%) Occlusion   80% Training Occlusion Pressure:  151 Exercises Performed   Single Leg Squats w/ Countertop, R leg toe touch as needed. 30,15,15,15  Single Leg Press w/ R toe touch as needed, 3plates dropping to 2.5 plates as needed for fatigue.  Total time under tourniquet   7min on - 1min off - 7min on Immediate effects   Fatigue Lingering effects (from previous session)   Reported no soreness or symptoms.     NOTES:       Blood Flow Restriction Training: Contraindications and precautions reviewed, pt safe for use of modality, Risks and benefits discussed; pt gave informed consent.

## 2024-01-09 ENCOUNTER — THERAPY VISIT (OUTPATIENT)
Dept: PHYSICAL THERAPY | Facility: CLINIC | Age: 29
End: 2024-01-09
Payer: COMMERCIAL

## 2024-01-09 DIAGNOSIS — Z98.890 S/P LIGAMENT REPAIR: Primary | ICD-10-CM

## 2024-01-09 PROCEDURE — 97110 THERAPEUTIC EXERCISES: CPT | Mod: GP | Performed by: PHYSICAL THERAPIST

## 2024-01-09 NOTE — PROGRESS NOTES
Date   01/09/2024 Limb Occlusion Pressure   222 Percent (%) Occlusion   80 Training Occlusion Pressure   178 Exercises Performed   Step Up 4inch, 6inch at 3mins, 7inch at 2mins box -  30,15,15,15  Leg Press 3pl dropping to 2.5pl as needed at 3min - 30,15,15,15 Total time under tourniquet   7min on, 1 min off, 7 min on  Immediate effects   Fatigue Lingering effects (from previous session)   None    NOTES:     Blood Flow Restriction Training: Contraindications and precautions reviewed, pt safe for use of modality, Risks and benefits discussed; pt gave informed consent.

## 2024-01-12 ENCOUNTER — THERAPY VISIT (OUTPATIENT)
Dept: PHYSICAL THERAPY | Facility: CLINIC | Age: 29
End: 2024-01-12
Payer: COMMERCIAL

## 2024-01-12 DIAGNOSIS — Z98.890 S/P LIGAMENT REPAIR: Primary | ICD-10-CM

## 2024-01-12 PROCEDURE — 97110 THERAPEUTIC EXERCISES: CPT | Mod: GP | Performed by: PHYSICAL THERAPIST

## 2024-01-12 NOTE — PROGRESS NOTES
Date   1/12/2024 Limb Occlusion Pressure   178 Percent (%) Occlusion   80 Training Occlusion Pressure   142 Exercises Performed   Short Arc Quad w/ 5lbs 30,15,15,15  SL Leg Press  3pl, dropping to 2.5pl as needed   30,15,15,15 Total time under tourniquet   7 min on, 1 min off, 7min on   Immediate effects   Fatigue (RPE 7/10 after SAQ. RPE 9/10 for Leg Press)  Lingering effects (from previous session)   None     NOTES:       Blood Flow Restriction Training: Contraindications and precautions reviewed, pt safe for use of modality, Risks and benefits discussed; pt gave informed consent.

## 2024-01-16 ENCOUNTER — THERAPY VISIT (OUTPATIENT)
Dept: PHYSICAL THERAPY | Facility: CLINIC | Age: 29
End: 2024-01-16
Payer: COMMERCIAL

## 2024-01-16 DIAGNOSIS — Z98.890 S/P LIGAMENT REPAIR: Primary | ICD-10-CM

## 2024-01-16 PROCEDURE — 97110 THERAPEUTIC EXERCISES: CPT | Mod: GP | Performed by: PHYSICAL THERAPIST

## 2024-01-16 NOTE — PROGRESS NOTES
Date   1/16/2024 Limb Occlusion Pressure   176 Percent (%) Occlusion   80 Training Occlusion Pressure   141 Exercises Performed   1.) SL squats  2.) SL leg press (3.0 pl)  Total time under tourniquet   7-1-7  Immediate effects   fatigue Lingering effects (from previous session)   none    NOTES:       Blood Flow Restriction Training: Contraindications and precautions reviewed, pt safe for use of modality, Risks and benefits discussed; pt gave informed consent

## 2024-01-19 ENCOUNTER — THERAPY VISIT (OUTPATIENT)
Dept: PHYSICAL THERAPY | Facility: CLINIC | Age: 29
End: 2024-01-19
Payer: COMMERCIAL

## 2024-01-19 DIAGNOSIS — Z98.890 S/P LIGAMENT REPAIR: Primary | ICD-10-CM

## 2024-01-19 PROCEDURE — 97110 THERAPEUTIC EXERCISES: CPT | Mod: GP | Performed by: PHYSICAL THERAPIST

## 2024-01-19 PROCEDURE — 97112 NEUROMUSCULAR REEDUCATION: CPT | Mod: GP | Performed by: PHYSICAL THERAPIST

## 2024-01-19 NOTE — PROGRESS NOTES
Date   1/19/2024 Limb Occlusion Pressure   194 Percent (%) Occlusion   80 Training Occlusion Pressure   155 Exercises Performed   1.) squats (SL squats as much as tolerated)  2.) SL leg press (3.0)  Total time under tourniquet   7-1-7  Immediate effects   Fatigue  Lingering effects (from previous session)   none    NOTES:       Blood Flow Restriction Training: Contraindications and precautions reviewed, pt safe for use of modality, Risks and benefits discussed; pt gave informed consent

## 2024-01-23 ENCOUNTER — THERAPY VISIT (OUTPATIENT)
Dept: PHYSICAL THERAPY | Facility: CLINIC | Age: 29
End: 2024-01-23
Payer: COMMERCIAL

## 2024-01-23 DIAGNOSIS — Z98.890 S/P LIGAMENT REPAIR: Primary | ICD-10-CM

## 2024-01-23 PROCEDURE — 97110 THERAPEUTIC EXERCISES: CPT | Mod: GP | Performed by: PHYSICAL THERAPIST

## 2024-01-23 NOTE — PROGRESS NOTES
Date   1/23/2024 Limb Occlusion Pressure   190 Percent (%) Occlusion   80% Training Occlusion Pressure   151 Exercises Performed   1.) SL leg press (3pl)  2.) SL squats at sink  Total time under tourniquet   7-1-7  Immediate effects   fatigue Lingering effects (from previous session)   none    NOTES:       Blood Flow Restriction Training: Contraindications and precautions reviewed, pt safe for use of modality, Risks and benefits discussed; pt gave informed consent

## 2024-01-23 NOTE — PROGRESS NOTES
Date   1/23/2024 Limb Occlusion Pressure   190 Percent (%) Occlusion   80 Training Occlusion Pressure   152 Exercises Performed   SL Leg press 3pl , seat 3 - 30,15,15,15  SL Squat at Sink - 30,15,15,15 Total time under tourniquet   7min on, 1 min off, 7 min on  Immediate effects   Fatigue 10/10 RPE EOS Lingering effects (from previous session)   none    NOTES:       Blood Flow Restriction Training: Contraindications and precautions reviewed, pt safe for use of modality, Risks and benefits discussed; pt gave informed consent

## 2024-01-30 ENCOUNTER — THERAPY VISIT (OUTPATIENT)
Dept: PHYSICAL THERAPY | Facility: CLINIC | Age: 29
End: 2024-01-30
Payer: COMMERCIAL

## 2024-01-30 DIAGNOSIS — Z98.890 S/P LIGAMENT REPAIR: Primary | ICD-10-CM

## 2024-01-30 PROCEDURE — 97110 THERAPEUTIC EXERCISES: CPT | Mod: GP | Performed by: PHYSICAL THERAPIST

## 2024-01-30 NOTE — PROGRESS NOTES
Date   1/30/2024 Limb Occlusion Pressure   178 Percent (%) Occlusion   80% Training Occlusion Pressure   142 Exercises Performed   1.) SL squat at sink  2.) SL leg press (3pl)  Total time under tourniquet   7-1-7  Immediate effects   fatigue Lingering effects (from previous session)   none    NOTES:       Blood Flow Restriction Training: Contraindications and precautions reviewed, pt safe for use of modality, Risks and benefits discussed; pt gave informed consent    
abdominal pain

## 2024-02-02 ENCOUNTER — THERAPY VISIT (OUTPATIENT)
Dept: PHYSICAL THERAPY | Facility: CLINIC | Age: 29
End: 2024-02-02
Payer: MEDICAID

## 2024-02-02 DIAGNOSIS — Z98.890 S/P LIGAMENT REPAIR: Primary | ICD-10-CM

## 2024-02-02 PROCEDURE — 97110 THERAPEUTIC EXERCISES: CPT | Mod: GP | Performed by: PHYSICAL THERAPIST

## 2024-02-02 NOTE — PROGRESS NOTES
Date   2/2/2024 Limb Occlusion Pressure   222 Percent (%) Occlusion   80% Training Occlusion Pressure   178 Exercises Performed   1.)SL squats  2.) SL leg press (3.5)  Total time under tourniquet   7-1-7  Immediate effects   fatigue Lingering effects (from previous session)   none    NOTES:       Blood Flow Restriction Training: Contraindications and precautions reviewed, pt safe for use of modality, Risks and benefits discussed; pt gave informed consent

## 2024-02-06 ENCOUNTER — THERAPY VISIT (OUTPATIENT)
Dept: PHYSICAL THERAPY | Facility: CLINIC | Age: 29
End: 2024-02-06
Payer: MEDICAID

## 2024-02-06 DIAGNOSIS — Z98.890 S/P LIGAMENT REPAIR: Primary | ICD-10-CM

## 2024-02-06 PROCEDURE — 97110 THERAPEUTIC EXERCISES: CPT | Mod: GP | Performed by: PHYSICAL THERAPIST

## 2024-02-06 NOTE — PROGRESS NOTES
Date   2/6/24 Limb Occlusion Pressure   177 Percent (%) Occlusion   80 Training Occlusion Pressure   142 Exercises Performed   Single Leg Squats at Sink - 30,15,15,15  Leg Press Seat 4, 3pl - 30,15,15,15 - 10/10 RPE   Total time under tourniquet   7, 1, 7 Immediate effects   Fatigue Lingering effects (from previous session)   None     NOTES:     Blood Flow Restriction Training: Contraindications and precautions reviewed, pt safe for use of modality, Risks and benefits discussed; pt gave informed consent.

## 2024-02-08 ENCOUNTER — THERAPY VISIT (OUTPATIENT)
Dept: PHYSICAL THERAPY | Facility: CLINIC | Age: 29
End: 2024-02-08
Payer: MEDICAID

## 2024-02-08 DIAGNOSIS — Z98.890 S/P LIGAMENT REPAIR: Primary | ICD-10-CM

## 2024-02-08 PROCEDURE — 97110 THERAPEUTIC EXERCISES: CPT | Mod: 59 | Performed by: PHYSICAL THERAPIST

## 2024-02-08 PROCEDURE — 97530 THERAPEUTIC ACTIVITIES: CPT | Mod: GP | Performed by: PHYSICAL THERAPIST

## 2024-02-08 NOTE — PROGRESS NOTES
Date   2/8/24 Limb Occlusion Pressure   183 Percent (%) Occlusion   80 Training Occlusion Pressure   146 Exercises Performed   SL Squats at bar - 10/10 RPE   SL leg press: seat 4. 3pl - 10/10 RPE  Total time under tourniquet   7min, 1min 7min  Immediate effects   Fatigue  Lingering effects (from previous session)   None     NOTES:       Blood Flow Restriction Training: Contraindications and precautions reviewed, pt safe for use of modality, Risks and benefits discussed; pt gave informed consent

## 2024-02-13 ENCOUNTER — THERAPY VISIT (OUTPATIENT)
Dept: PHYSICAL THERAPY | Facility: CLINIC | Age: 29
End: 2024-02-13
Payer: MEDICAID

## 2024-02-13 DIAGNOSIS — Z98.890 S/P LIGAMENT REPAIR: Primary | ICD-10-CM

## 2024-02-13 PROCEDURE — 97110 THERAPEUTIC EXERCISES: CPT | Mod: GP | Performed by: PHYSICAL THERAPIST

## 2024-02-13 PROCEDURE — 97530 THERAPEUTIC ACTIVITIES: CPT | Mod: GP | Performed by: PHYSICAL THERAPIST

## 2024-02-13 NOTE — PROGRESS NOTES
Date   2/13/24 Limb Occlusion Pressure   185 Percent (%) Occlusion   80% Training Occlusion Pressure   148 Exercises Performed   SL squats at bar 30,15,15,18 - RPE 10/10  SL Leg press seat 4, 3pl - 30,15,15,19 - RPE 10/10 Total time under tourniquet   7,1,7  Immediate effects   fatigue Lingering effects (from previous session)   Minimal soreness     NOTES:       Blood Flow Restriction Training: Contraindications and precautions reviewed, pt safe for use of modality, Risks and benefits discussed; pt gave informed consent

## 2024-02-15 ENCOUNTER — THERAPY VISIT (OUTPATIENT)
Dept: PHYSICAL THERAPY | Facility: CLINIC | Age: 29
End: 2024-02-15
Payer: MEDICAID

## 2024-02-15 DIAGNOSIS — Z98.890 S/P LIGAMENT REPAIR: Primary | ICD-10-CM

## 2024-02-15 PROCEDURE — 97110 THERAPEUTIC EXERCISES: CPT | Mod: GP | Performed by: PHYSICAL THERAPIST

## 2024-02-15 PROCEDURE — 97530 THERAPEUTIC ACTIVITIES: CPT | Mod: GP | Performed by: PHYSICAL THERAPIST

## 2024-02-15 ASSESSMENT — ACTIVITIES OF DAILY LIVING (ADL)
HOW_WOULD_YOU_RATE_THE_OVERALL_FUNCTION_OF_YOUR_KNEE_DURING_YOUR_USUAL_DAILY_ACTIVITIES?: ABNORMAL
RAW_SCORE: 40
KNEEL ON THE FRONT OF YOUR KNEE: I AM UNABLE TO DO THE ACTIVITY
SWELLING: THE SYMPTOM AFFECTS MY ACTIVITY MODERATELY
GO DOWN STAIRS: ACTIVITY IS SOMEWHAT DIFFICULT
LIMPING: I HAVE THE SYMPTOM BUT IT DOES NOT AFFECT MY ACTIVITY
KNEE_ACTIVITY_OF_DAILY_LIVING_SCORE: 57.14
WEAKNESS: THE SYMPTOM AFFECTS MY ACTIVITY MODERATELY
HOW_WOULD_YOU_RATE_THE_CURRENT_FUNCTION_OF_YOUR_KNEE_DURING_YOUR_USUAL_DAILY_ACTIVITIES_ON_A_SCALE_FROM_0_TO_100_WITH_100_BEING_YOUR_LEVEL_OF_KNEE_FUNCTION_PRIOR_TO_YOUR_INJURY_AND_0_BEING_THE_INABILITY_TO_PERFORM_ANY_OF_YOUR_USUAL_DAILY_ACTIVITIES?: 65
SQUAT: ACTIVITY IS SOMEWHAT DIFFICULT
WALK: ACTIVITY IS MINIMALLY DIFFICULT
AS_A_RESULT_OF_YOUR_KNEE_INJURY,_HOW_WOULD_YOU_RATE_YOUR_CURRENT_LEVEL_OF_DAILY_ACTIVITY?: NEARLY NORMAL
STIFFNESS: THE SYMPTOM AFFECTS MY ACTIVITY SLIGHTLY
RISE FROM A CHAIR: ACTIVITY IS MINIMALLY DIFFICULT
KNEE_ACTIVITY_OF_DAILY_LIVING_SUM: 40
SIT WITH YOUR KNEE BENT: ACTIVITY IS SOMEWHAT DIFFICULT
STAND: ACTIVITY IS MINIMALLY DIFFICULT
GIVING WAY, BUCKLING OR SHIFTING OF KNEE: THE SYMPTOM AFFECTS MY ACTIVITY SLIGHTLY
GO UP STAIRS: ACTIVITY IS SOMEWHAT DIFFICULT
PAIN: THE SYMPTOM AFFECTS MY ACTIVITY MODERATELY

## 2024-02-15 NOTE — PROGRESS NOTES
Date   2/15/24 Limb Occlusion Pressure   186 Percent (%) Occlusion   80% Training Occlusion Pressure   149 Exercises Performed   SL squats at bar 30,15,15,15  SL Leg Press seat 4, 3pl, 30,15,15,15 Total time under tourniquet   7,1,7  Immediate effects   Fatigue  Lingering effects (from previous session)   None     NOTES:       Blood Flow Restriction Training: Contraindications and precautions reviewed, pt safe for use of modality, Risks and benefits discussed; pt gave informed consent.

## 2024-02-20 ENCOUNTER — THERAPY VISIT (OUTPATIENT)
Dept: PHYSICAL THERAPY | Facility: CLINIC | Age: 29
End: 2024-02-20
Payer: MEDICAID

## 2024-02-20 DIAGNOSIS — Z98.890 S/P LIGAMENT REPAIR: Primary | ICD-10-CM

## 2024-02-20 PROCEDURE — 97530 THERAPEUTIC ACTIVITIES: CPT | Mod: GP | Performed by: PHYSICAL THERAPIST

## 2024-02-20 PROCEDURE — 97112 NEUROMUSCULAR REEDUCATION: CPT | Mod: 59 | Performed by: PHYSICAL THERAPIST

## 2024-02-20 PROCEDURE — 97110 THERAPEUTIC EXERCISES: CPT | Mod: 59 | Performed by: PHYSICAL THERAPIST

## 2024-02-20 NOTE — PROGRESS NOTES
Date   2/20/24 Limb Occlusion Pressure   195 Percent (%) Occlusion   80% Training Occlusion Pressure   156 Exercises Performed   SL Squat 30,15,15,15  SL LegPress 30,15,15,20 Total time under tourniquet   7,1,7 Immediate effects   Fatigue  Lingering effects (from previous session)   none    NOTES:    Blood Flow Restriction Training: Contraindications and precautions reviewed, pt safe for use of modality, Risks and benefits discussed; pt gave informed consent

## 2024-02-22 ENCOUNTER — THERAPY VISIT (OUTPATIENT)
Dept: PHYSICAL THERAPY | Facility: CLINIC | Age: 29
End: 2024-02-22
Payer: MEDICAID

## 2024-02-22 DIAGNOSIS — Z98.890 S/P LIGAMENT REPAIR: Primary | ICD-10-CM

## 2024-02-22 PROCEDURE — 97110 THERAPEUTIC EXERCISES: CPT | Mod: GP | Performed by: PHYSICAL THERAPIST

## 2024-02-22 PROCEDURE — 97112 NEUROMUSCULAR REEDUCATION: CPT | Mod: GP | Performed by: PHYSICAL THERAPIST

## 2024-02-22 NOTE — PROGRESS NOTES
Date   2/22/24 Limb Occlusion Pressure   191 Percent (%) Occlusion   80 Training Occlusion Pressure   153 Exercises Performed    SL Squats - 30,15,15,15  SL Leg Press - 30,15,15,15 Total time under tourniquet   7,1,7  Immediate effects   fatigue Lingering effects (from previous session)   none    NOTES:       Blood Flow Restriction Training: Contraindications and precautions reviewed, pt safe for use of modality, Risks and benefits discussed; pt gave informed consent

## 2024-02-27 ENCOUNTER — THERAPY VISIT (OUTPATIENT)
Dept: PHYSICAL THERAPY | Facility: CLINIC | Age: 29
End: 2024-02-27
Payer: MEDICAID

## 2024-02-27 DIAGNOSIS — Z98.890 S/P LIGAMENT REPAIR: Primary | ICD-10-CM

## 2024-02-27 PROCEDURE — 99207 PR NO CHARGE LOS: CPT | Mod: GP | Performed by: PHYSICAL THERAPIST

## 2024-02-29 ENCOUNTER — THERAPY VISIT (OUTPATIENT)
Dept: PHYSICAL THERAPY | Facility: CLINIC | Age: 29
End: 2024-02-29
Payer: MEDICAID

## 2024-02-29 DIAGNOSIS — Z98.890 S/P LIGAMENT REPAIR: Primary | ICD-10-CM

## 2024-02-29 PROCEDURE — 97110 THERAPEUTIC EXERCISES: CPT | Mod: GP | Performed by: PHYSICAL THERAPIST

## 2024-03-05 ENCOUNTER — THERAPY VISIT (OUTPATIENT)
Dept: PHYSICAL THERAPY | Facility: CLINIC | Age: 29
End: 2024-03-05
Payer: COMMERCIAL

## 2024-03-05 DIAGNOSIS — Z98.890 S/P LIGAMENT REPAIR: Primary | ICD-10-CM

## 2024-03-05 PROCEDURE — 97110 THERAPEUTIC EXERCISES: CPT | Mod: GP | Performed by: PHYSICAL THERAPIST

## 2024-03-05 NOTE — PROGRESS NOTES
03/05/24 0500   Appointment Info   Signing clinician's name / credentials Kenia Lopez, PT, SCS & Aaron Marie, SPT   Total/Authorized Visits E&T   Visits Used 43   Medical Diagnosis L knee post op: MPFL reconstruction, Tibial Tub osteotomy   PT Tx Diagnosis L knee decreased ROM, and strength   Precautions/Limitations see MD protocols   Quick Adds Certification   Progress Note/Certification   Start of Care Date 09/14/23   Onset of illness/injury or Date of Surgery 09/07/23   Therapy Frequency 1X/week   Predicted Duration 12 weeks   Certification date from 02/27/24   Certification date to 05/21/24   Progress Note Due Date 05/21/24   Progress Note Completed Date 03/05/24   GOALS   PT Goals 2   PT Goal 1   Goal Identifier Goal 1   Goal Description Pt will be able to go up and down stairs, without a railing, painfree   Rationale to maximize safety and independence within the home   Goal Progress improving, still using railing/assistance   Target Date 05/21/24   PT Goal 2   Goal Identifier Goal 2   Goal Description Pt wll be able to walk, without AD, household distance, with no pain   Rationale to maximize safety and independence with performance of ADLs and functional tasks;to maximize safety and independence within the home   Target Date 02/27/24   Date Met 03/05/24   Subjective Report   Subjective Report Pt notes sickness, so recovering and not as active this week.  Pt notes swelling doesn't go away; still feels some instability. She does note that overall her daily function has improved this last month.   Objective Measure 1   Objective Measure AROM   Details 2-0-145   Objective Measure 2   Objective Measure Swellilng Mid Patella   Details mid patella R=40cm; L=41.5cm   Objective Measure 3   Objective Measure Quad girth   Details 5cm: L 43.5cm; R 46cm   Treatment Interventions (PT)   Interventions Therapeutic Procedure/Exercise   Therapeutic Procedure/Exercise   Therapeutic Procedures: strength, endurance,  ROM, flexibillity minutes (85136) 40   Therapeutic Procedures Ther Proc 2;Ther Proc 3;Ther Proc 4;Ther Proc 5;Ther Proc 6   Ther Proc 1 Bike seat 11   Ther Proc 1 - Details warmup 5 mins   Ther Proc 2 Leg press SL   Ther Proc 2 - Details w/BFR (3pl)   Ther Proc 3 Education   Ther Proc 3 - Details on core strengthening, and continued strength   Ther Proc 4 SL squat at sink   Ther Proc 4 - Details w/BFR   Skilled Intervention instructed on exercsies to help reduce pain and strengthening   Patient Response/Progress Pt tolerated well   Education   Learner/Method Patient;Pictures/Video   Education Comments education on post op protocol, on restrictions   Plan   Home program See PTRx (phone)   Plan for next session continue to strengthen with BFR-  performance testing this month (March)   Total Session Time   Timed Code Treatment Minutes 40   Total Treatment Time (sum of timed and untimed services) 40         [facility]                                                                                   OUTPATIENT PHYSICAL THERAPY    PLAN OF TREATMENT FOR OUTPATIENT REHABILITATION   Patient's Last Name, First Name, Tiffanie Gillis YOB: 1995   Provider's Name   [facility]   Medical Record No.  [id number]     Onset Date: 09/07/23  Start of Care Date: 09/14/23     Medical Diagnosis:  L knee post op: MPFL reconstruction, Tibial Tub osteotomy      PT Treatment Diagnosis:  L knee decreased ROM, and strength Plan of Treatment  Frequency/Duration: 1X/week/ 12 weeks    Certification date from 02/27/24 to (P) 05/21/24         See note for plan of treatment details and functional goals     Kenia Lopez, PT                         I CERTIFY THE NEED FOR THESE SERVICES FURNISHED UNDER        THIS PLAN OF TREATMENT AND WHILE UNDER MY CARE     (Physician attestation of this document indicates review and certification of the therapy plan).               Referring Provider:  Talia Preciado    Initial Assessment  See Epic Evaluation- Start of Care Date: 09/14/23

## 2024-03-05 NOTE — PROGRESS NOTES
Date   3/5/24 Limb Occlusion Pressure   194 Percent (%) Occlusion   80% Training Occlusion Pressure   155 (adjusted to 140) Exercises Performed   1.) SL squats  2.) SL leg press  Total time under tourniquet   7-1-7min  Immediate effects   fatigue Lingering effects (from previous session)   none    NOTES:       Blood Flow Restriction Training: Contraindications and precautions reviewed, pt safe for use of modality, Risks and benefits discussed; pt gave informed consent

## 2024-03-07 ENCOUNTER — THERAPY VISIT (OUTPATIENT)
Dept: PHYSICAL THERAPY | Facility: CLINIC | Age: 29
End: 2024-03-07
Payer: COMMERCIAL

## 2024-03-07 DIAGNOSIS — Z98.890 S/P LIGAMENT REPAIR: Primary | ICD-10-CM

## 2024-03-07 PROCEDURE — 97110 THERAPEUTIC EXERCISES: CPT | Mod: GP | Performed by: PHYSICAL THERAPIST

## 2024-03-07 NOTE — PROGRESS NOTES
Date   3/7/2024 Limb Occlusion Pressure   226 Percent (%) Occlusion   80% (adjusted for comfort) Training Occlusion Pressure   181 (adjusted to 145) Exercises Performed   1.) SL squats  2.) SL leg press (3pl)  Total time under tourniquet   7-1-7 min  Immediate effects   fatigue Lingering effects (from previous session)   none    NOTES:       Blood Flow Restriction Training: Contraindications and precautions reviewed, pt safe for use of modality, Risks and benefits discussed; pt gave informed consent     No

## 2024-03-12 ENCOUNTER — THERAPY VISIT (OUTPATIENT)
Dept: PHYSICAL THERAPY | Facility: CLINIC | Age: 29
End: 2024-03-12
Payer: COMMERCIAL

## 2024-03-12 DIAGNOSIS — Z98.890 S/P LIGAMENT REPAIR: Primary | ICD-10-CM

## 2024-03-12 PROCEDURE — 97110 THERAPEUTIC EXERCISES: CPT | Mod: GP | Performed by: PHYSICAL THERAPIST

## 2024-03-12 NOTE — PROGRESS NOTES
Date   3/12/2024 Limb Occlusion Pressure   210 Percent (%) Occlusion   80% Training Occlusion Pressure   168 (adjusted to 145) Exercises Performed   1.) SL squats  2.) SL leg press  Total time under tourniquet   7-1-7 min  Immediate effects   Fatigue Lingering effects (from previous session)   none    NOTES:       Blood Flow Restriction Training: Contraindications and precautions reviewed, pt safe for use of modality, Risks and benefits discussed; pt gave informed consent     Initial (On Arrival)

## 2024-03-14 ENCOUNTER — THERAPY VISIT (OUTPATIENT)
Dept: PHYSICAL THERAPY | Facility: CLINIC | Age: 29
End: 2024-03-14
Payer: COMMERCIAL

## 2024-03-14 DIAGNOSIS — Z98.890 S/P LIGAMENT REPAIR: Primary | ICD-10-CM

## 2024-03-14 PROCEDURE — 97110 THERAPEUTIC EXERCISES: CPT | Mod: GP | Performed by: PHYSICAL THERAPIST

## 2024-03-14 NOTE — PROGRESS NOTES
Lower Extremity Physical Performance Testing    Surgery/Injury: L MPFL and TTO      Involved Extremity: left   Date of Surgery/Injury: 2023    Surgeon/MD: Dr. Preciado  Therapist performing test: Lesly Lopez PT, SCS     Primary Treating Therapist: Lesly Lopez PT, YVAN    Patient subjective symptom/function report: see flowsheet    LSI% =Limb Symmetry Index (score comparison between involved/uninvolved extremity)    Anthropomorphic Measures      Range of Motion Jt. Line Circum. Measurement 5 cm Prox Circum. Measurement   Uninvolved 2-148 degrees 36.4 cm 48 cm   Involved 0-0-146 degrees 36.4 cm 45.2 cm   Difference  0 cm 2.8 cm     Evaluation chosen: Return to Function (Level I): Balance and Muscle Strength    Return to Function (Level I): Balance, Muscle Strength   Testing Protocol: Recorded values = best effort of 3 attempts (after 2 practice attempts).      Single Leg Balance Max = 60 seconds   Uninvolved Extremity 60 seconds   Involved Extremity 60 seconds   LSI% 100%     Single Leg Squat    Uninvolved Extremity 96 degrees   Involved Extremity 90 degrees   LSI% 94%       Return to Function (Level I): Core Stability. Allowed at or after 2-4 months post-op ACL     Return to Function (Level I): Core Stability   Perceived Exertion Ratin to 5 point scale, (0) Very Easy << >> (5) Maximal Exertion.  1 verbal cuing episode for alignment correction allowed before testing terminated.  Progress patient from basic to advanced versions of core poses as strength/endurance/control improves.    Prone Plank Hold: Pose: basic X/60 Seconds Perceived Exertion   Hold Time 60/60 seconds 3   LSI% 100%      Side Plank Hold: Pose: basic X/60 Seconds Percent Return Perceived Exertion   Uninvolved Side Down 60/60 seconds 100% 4   Involved Side Down 60/60 seconds 100% 4   LSI% 100%             Assessment/Plan:  Due to pregnancy and SI pain, will need to belt with some testing next.   Very good core control and strength. 94% on SL squat for  depth

## 2024-03-18 ENCOUNTER — THERAPY VISIT (OUTPATIENT)
Dept: PHYSICAL THERAPY | Facility: CLINIC | Age: 29
End: 2024-03-18
Payer: COMMERCIAL

## 2024-03-18 DIAGNOSIS — Z98.890 S/P LIGAMENT REPAIR: Primary | ICD-10-CM

## 2024-03-18 PROCEDURE — 97110 THERAPEUTIC EXERCISES: CPT | Mod: GP | Performed by: PHYSICAL THERAPIST

## 2024-03-18 NOTE — PROGRESS NOTES
Lower Extremity Physical Performance Testing      All tests done with SI Belt on to help with instability pain (due to early pregnancy)      Return to Function (Level I): Balance, Muscle Strength   Testing Protocol: Recorded values = best effort of 3 attempts (after 2 practice attempts).    Single Leg Stand and Reach Anterior/Medial Anterior/Lateral   Uninvolved Extremity 80 cm 80 cm   Involved Extremity 80 cm   76 cm   LSI% 100% 95 %         Retro Step    Uninvolved Extremity 8 in.   Involved Extremity 6 in. (Very challenged with 8 in, with compensation)     LSI% 75%       Return to Function (Level I): Core Stability. Allowed at or after 2-4 months post-op ACL     Return to Function (Level I): Core Stability   Perceived Exertion Ratin to 5 point scale, (0) Very Easy << >> (5) Maximal Exertion.  1 verbal cuing episode for alignment correction allowed before testing terminated.  Progress patient from basic to advanced versions of core poses as strength/endurance/control improves.      Single Leg Bridge Reps (Tempo 60 BPM) # of Reps to Failure   Uninvolved Extremity 19   Involved Extremity 19   LSI% 100%

## 2024-04-09 ENCOUNTER — THERAPY VISIT (OUTPATIENT)
Dept: PHYSICAL THERAPY | Facility: CLINIC | Age: 29
End: 2024-04-09
Payer: COMMERCIAL

## 2024-04-09 DIAGNOSIS — Z98.890 S/P LIGAMENT REPAIR: Primary | ICD-10-CM

## 2024-04-09 PROCEDURE — 97110 THERAPEUTIC EXERCISES: CPT | Mod: 59 | Performed by: PHYSICAL THERAPIST

## 2024-04-09 PROCEDURE — 97530 THERAPEUTIC ACTIVITIES: CPT | Mod: GP | Performed by: PHYSICAL THERAPIST

## 2024-04-16 ENCOUNTER — THERAPY VISIT (OUTPATIENT)
Dept: PHYSICAL THERAPY | Facility: CLINIC | Age: 29
End: 2024-04-16
Payer: COMMERCIAL

## 2024-04-16 DIAGNOSIS — Z98.890 S/P LIGAMENT REPAIR: Primary | ICD-10-CM

## 2024-04-16 PROCEDURE — 97110 THERAPEUTIC EXERCISES: CPT | Mod: GP | Performed by: PHYSICAL THERAPIST

## 2024-04-18 ENCOUNTER — TELEPHONE (OUTPATIENT)
Dept: ORTHOPEDICS | Facility: CLINIC | Age: 29
End: 2024-04-18
Payer: COMMERCIAL

## 2024-04-18 NOTE — TELEPHONE ENCOUNTER
- A call was placed to the patient.     - Pt has medical issues and is unable to come to functional test and appointment on Tuesday.     - Patient verbalized understanding of plan and all questions were answered. Call back number to clinic was given and patient was told to call if they had an further questions.

## 2024-04-18 NOTE — TELEPHONE ENCOUNTER
Other: pt called has an appt on Tuesday. Per pt has a medical issue that came up and doesn't know if she should reschedule or not, but would like provider or care team input. Pt said that it is personal and would like to speak to provider. Can care team call her back?     Could we send this information to you in [facility] or would you prefer to receive a phone call?:   Patient would prefer a phone call   Okay to leave a detailed message?: Yes at Cell number on file:    Telephone Information:   Mobile [phone]

## 2024-04-30 ENCOUNTER — THERAPY VISIT (OUTPATIENT)
Dept: PHYSICAL THERAPY | Facility: CLINIC | Age: 29
End: 2024-04-30
Payer: COMMERCIAL

## 2024-04-30 DIAGNOSIS — Z98.890 S/P LIGAMENT REPAIR: Primary | ICD-10-CM

## 2024-04-30 PROCEDURE — 97110 THERAPEUTIC EXERCISES: CPT | Mod: GP | Performed by: PHYSICAL THERAPIST

## 2024-04-30 NOTE — TELEPHONE ENCOUNTER
Spoke to pt to schedule in May 2024 with Dr. Preciado. Pt declined to schedule, asking if they can be seen in June. Sent to [facility].

## 2024-04-30 NOTE — TELEPHONE ENCOUNTER
Patient confirmed scheduled appointment:  Date: 6/18  Time: 11:20 ok per Kamille  Visit type: Return knee  Provider: Dr. Preciado  Pt wondering about r/s performance test from 4/23. Will send to Maia.

## 2024-04-30 NOTE — PROGRESS NOTES
04/30/24 0500   Appointment Info   Signing clinician's name / credentials Kenia Lopez, PT, SCS   Total/Authorized Visits E&T   Visits Used 50   Medical Diagnosis L knee post op: MPFL reconstruction, Tibial Tub osteotomy   PT Tx Diagnosis L knee decreased ROM, and strength   Precautions/Limitations see MD protocols   Quick Adds Certification   Progress Note/Certification   Start of Care Date 09/14/23   Onset of illness/injury or Date of Surgery 09/07/23   Therapy Frequency 1X/week   Predicted Duration 12 weeks   Certification date from 02/27/24   Certification date to 05/21/24   Progress Note Due Date 05/21/24   Progress Note Completed Date 04/30/24   GOALS   PT Goals 2   PT Goal 1   Goal Identifier Goal 1   Goal Description Pt will be able to go up and down stairs, without a railing, painfree   Rationale to maximize safety and independence within the home   Goal Progress improving, still using railing/assistance   Target Date 05/21/24   PT Goal 2   Goal Identifier Goal 2   Goal Description Pt wll be able to walk, without AD, household distance, with no pain   Rationale to maximize safety and independence with performance of ADLs and functional tasks;to maximize safety and independence within the home   Target Date 02/27/24   Date Met 03/05/24   Subjective Report   Subjective Report Pt had medical procedure (post miscarriage). No bleeding today, and feels ready to return to exercise. Still having some clicking in knee (up and down stairs). Squatting and stairs are most irritating activities.   Objective Measure 1   Objective Measure AROM   Details 2-0-148   Objective Measure 2   Objective Measure Swellilng Mid Patella   Details mid patella R=40.5cm; L=40.5cm   Objective Measure 3   Objective Measure Quad girth   Details 5cm: L 43.5cm; R 46cm   Objective Measure 4   Objective Measure Quad isometric strength testing: tindeq (at 70deg)   Details L= 109.1lbs;  R=151.2 lbs  (72.5%)   Treatment Interventions (PT)    Interventions Therapeutic Procedure/Exercise   Therapeutic Procedure/Exercise   Therapeutic Procedures: strength, endurance, ROM, flexibility minutes (85205) 40   Therapeutic Procedures Ther Proc 2;Ther Proc 3;Ther Proc 4;Ther Proc 5;Ther Proc 6   Ther Proc 1 Bike seat 11   Ther Proc 1 - Details 5 min   Ther Proc 2 Leg press (seat 4)   Ther Proc 2 - Details DL: 6-6.5 plX10;   SL: 4-4.5pl X10 each X10 each X 2 sets   Ther Proc 3 Pt education   Ther Proc 3 - Details on getting back into exercise routine. Aerobic and resistance component. Will start getting back to gym   Ther Proc 4 side stepping   Ther Proc 4 - Details BlackTB: X10 each X 3 sets   Skilled Intervention instructed on exercsies to help reduce pain and strengthening   Patient Response/Progress Pt tolerated well   Therapeutic Activity   Ther Act 1 Yenonell Taping- pt taught and taped both sides   Ther Act 1 - Details medial tilt and glides   Skilled Intervention skilled care to assist with petella alignment   Education   Learner/Method Patient;Pictures/Video   Education Comments education on post op protocol, on restrictions   Plan   Home program See PTRx (phone)   Plan for next session continue to strengthen with BFR-  performance testing this month (March)   Total Session Time   Timed Code Treatment Minutes 40   Total Treatment Time (sum of timed and untimed services) 40       PLAN  Continue therapy per current plan of care.    Beginning/End Dates of Progress Note Reporting Period:  04/30/24 to 04/30/2024    Referring Provider:  Talia Preciado

## 2024-05-07 ENCOUNTER — THERAPY VISIT (OUTPATIENT)
Dept: PHYSICAL THERAPY | Facility: CLINIC | Age: 29
End: 2024-05-07
Payer: COMMERCIAL

## 2024-05-07 DIAGNOSIS — Z98.890 S/P LIGAMENT REPAIR: Primary | ICD-10-CM

## 2024-05-07 PROCEDURE — 97110 THERAPEUTIC EXERCISES: CPT | Mod: GP | Performed by: PHYSICAL THERAPIST

## 2024-05-14 ENCOUNTER — THERAPY VISIT (OUTPATIENT)
Dept: PHYSICAL THERAPY | Facility: CLINIC | Age: 29
End: 2024-05-14
Payer: COMMERCIAL

## 2024-05-14 DIAGNOSIS — Z98.890 S/P LIGAMENT REPAIR: Primary | ICD-10-CM

## 2024-05-14 PROCEDURE — 97530 THERAPEUTIC ACTIVITIES: CPT | Mod: GP | Performed by: PHYSICAL THERAPIST

## 2024-05-14 PROCEDURE — 97110 THERAPEUTIC EXERCISES: CPT | Mod: GP | Performed by: PHYSICAL THERAPIST

## 2024-05-21 ENCOUNTER — THERAPY VISIT (OUTPATIENT)
Dept: PHYSICAL THERAPY | Facility: CLINIC | Age: 29
End: 2024-05-21
Payer: COMMERCIAL

## 2024-05-21 DIAGNOSIS — Z98.890 S/P LIGAMENT REPAIR: Primary | ICD-10-CM

## 2024-05-21 PROCEDURE — 97530 THERAPEUTIC ACTIVITIES: CPT | Mod: GP | Performed by: PHYSICAL THERAPIST

## 2024-05-21 PROCEDURE — 97112 NEUROMUSCULAR REEDUCATION: CPT | Mod: GP | Performed by: PHYSICAL THERAPIST

## 2024-05-21 PROCEDURE — 97110 THERAPEUTIC EXERCISES: CPT | Mod: GP | Performed by: PHYSICAL THERAPIST

## 2024-05-21 NOTE — PROGRESS NOTES
Pt overall has had some major life events that have deterred her progress the last 3 months (pregnancy and miscarriage); these events has significantly affected her progress with post op knee rehabilitation. Overall her motion is full and swelling is minimal. We continue to focus on strength and stability and to consistently build that up. Her right knee has been more of the problem with sharp and piercing pains.        05/21/24 0500   Appointment Info   Signing clinician's name / credentials Kenia Lopez, PT, SCS   Total/Authorized Visits E&T   Visits Used 53   Medical Diagnosis L knee post op: MPFL reconstruction, Tibial Tub osteotomy   PT Tx Diagnosis L knee decreased ROM, and strength   Quick Adds Certification   Progress Note/Certification   Start of Care Date 09/14/23   Onset of illness/injury or Date of Surgery 09/07/23   Therapy Frequency 1X/week   Predicted Duration 12 weeks   Certification date from 05/21/24   Certification date to 08/13/24   Progress Note Due Date 08/13/24   Progress Note Completed Date 05/21/24   GOALS   PT Goals 2   PT Goal 1   Goal Identifier Goal 1   Goal Description Pt will be able to go up and down stairs, without a railing, painfree   Rationale to maximize safety and independence within the home   Goal Progress improving, still some slight discomort   Target Date 08/13/24   PT Goal 2   Goal Identifier Goal 2   Goal Description Pt wll be able to walk, without AD, household distance, with no pain   Rationale to maximize safety and independence with performance of ADLs and functional tasks;to maximize safety and independence within the home   Goal Progress attained   Target Date 02/27/24   Date Met 03/05/24   Subjective Report   Subjective Report Pt notes her left knee is continuing to improve and get stronger, but her right knee has been very achy/painful. Tried roller blading but right knee was peircing pain. Gym workouts going well; focusing on strength and aerobic (2-3X/week).    Objective Measure 1   Objective Measure AROM   Details 2-0-143   Objective Measure 2   Objective Measure Swellilng Mid Patella   Details mid patella R=41.5cm; L=42cm   Objective Measure 3   Objective Measure Quad girth   Details 5cm: L 43.5cm; R 46cm   Objective Measure 4   Objective Measure Quad isometric strength testing: tindeq (at 70deg)   Details L= 109.1lbs;  R=151.2 lbs  (72.5%)   Treatment Interventions (PT)   Interventions Therapeutic Procedure/Exercise   Therapeutic Procedure/Exercise   Therapeutic Procedures: strength, endurance, ROM, flexibility minutes (43928) 30   Therapeutic Procedures Ther Proc 2;Ther Proc 3;Ther Proc 4;Ther Proc 5;Ther Proc 6;Ther Proc 7   Ther Proc 1 Bike seat 11   Ther Proc 1 - Details 5 min   Ther Proc 2 Leg press (seat 4)   Ther Proc 2 - Details DL: 8plX10;   SL: 5pl X10 each X10 each X 2 sets   Ther Proc 3 Pt education   Ther Proc 3 - Details on getting back into exercise routine. Aerobic and resistance component. Will start getting back to gym   Ther Proc 4 side stepping   Ther Proc 4 - Details BlackTB: X10 each X 3 sets   Ther Proc 5 wall sits (30-60-80)   Ther Proc 5 - Details 1 min each   Ther Proc 6 SLR sitting up   Ther Proc 6 - Details 5 sec X10 each side   Ther Proc 7 education   Ther Proc 7 - Details on gym routine/workouts   Skilled Intervention instructed on exercsies to help reduce pain and strengthening   Patient Response/Progress Pt tolerated well   Therapeutic Activity   Therapeutic Activities: dynamic activities to improve functional performance minutes (01393) 8   Ther Act 1 Jose Gll Taping- pt taught   Ther Act 1 - Details medial tilt and glide on right   Skilled Intervention skilled care to assist with petella alignment   Education   Learner/Method Patient;Pictures/Video   Education Comments education on post op protocol, on restrictions   Plan   Home program See PTRx (phone)   Plan for next session continue to strengthen with BFR-  performance testing this  month (March)       [facility]                                                                                   OUTPATIENT PHYSICAL THERAPY    PLAN OF TREATMENT FOR OUTPATIENT REHABILITATION   Patient's Last Name, First Name, Tiffanie Gillis YOB: 1995   Provider's Name   [facility]   Medical Record No.  [id number]     Onset Date: (P) 09/07/23  Start of Care Date: (P) 09/14/23     Medical Diagnosis:  (P) L knee post op: MPFL reconstruction, Tibial Tub osteotomy      PT Treatment Diagnosis:  (P) L knee decreased ROM, and strength Plan of Treatment  Frequency/Duration: (P) 1X/week/ (P) 12 weeks    Certification date from (P) 05/21/24 to (P) 08/13/24         See note for plan of treatment details and functional goals     Kenia Lopez, PT                         I CERTIFY THE NEED FOR THESE SERVICES FURNISHED UNDER        THIS PLAN OF TREATMENT AND WHILE UNDER MY CARE     (Physician attestation of this document indicates review and certification of the therapy plan).              Referring Provider:  Talia Preciado    Initial Assessment  See Epic Evaluation- Start of Care Date: (P) 09/14/23

## 2024-06-18 ENCOUNTER — THERAPY VISIT (OUTPATIENT)
Dept: PHYSICAL THERAPY | Facility: CLINIC | Age: 29
End: 2024-06-18
Payer: COMMERCIAL

## 2024-06-18 ENCOUNTER — OFFICE VISIT (OUTPATIENT)
Dept: ORTHOPEDICS | Facility: CLINIC | Age: 29
End: 2024-06-18
Payer: COMMERCIAL

## 2024-06-18 DIAGNOSIS — Z98.890 S/P KNEE SURGERY: Primary | ICD-10-CM

## 2024-06-18 PROCEDURE — 97112 NEUROMUSCULAR REEDUCATION: CPT | Mod: GP

## 2024-06-18 PROCEDURE — 97161 PT EVAL LOW COMPLEX 20 MIN: CPT | Mod: GP

## 2024-06-18 PROCEDURE — 99213 OFFICE O/P EST LOW 20 MIN: CPT | Performed by: ORTHOPAEDIC SURGERY

## 2024-06-18 PROCEDURE — 97110 THERAPEUTIC EXERCISES: CPT | Mod: GP

## 2024-06-18 NOTE — LETTER
6/18/2024      Tiffanie TREMAINE Kaye  [address] [place] [address]      Dear Colleague,    Thank you for referring your patient, Tiffanie Restrepo, to the [facility] [place]. Please see a copy of my visit note below.    Patient patient is a 28-year-old female who is now nearly 9 months status post a left knee MPFL reconstruction, distal tip tube osteotomy, LRL with partial lateral facetectomy.    Postop she had significant anxiety and initial pain that limited advancement in physical therapy.     She then had she also was pregnant in her first trimester.  She gained about 35 pounds.  She subsequently miscarried and had a D&C.  In addition to the emotional distress she is trying to lose the added weight.  Her baseline weight in her mind is 175 285.  She weighed 210 at her top weight and she now is about 200.     She admits that during this time she did not pay as much attention to her strengthening as she should.  She has been seeing Riri at physical therapy all along but admits that she needs a structure and is not very motivated to do it on her own.    In regards to her operated left leg she feels that there is some clicking and noise but otherwise she feels that it is a stable knee.  She is having increasing pain on her opposite right knee.  This is what we spent most of the time talking about.    She has not had a recent dislocation but feels that her right knee has been subject to several subluxation events.  However it is the pain that bothers her the most.  She also believes that this knee swells.    She is worried about the future in regards to her right knee.  She has tried a tape technique with Lesly and it does offer her some help in regards to this pain.  She also has occasionally take anti-inflammatory with some positive effect but she does not like to do this on a daily basis.    A functional test was done previsit.  It reveals that her operative left knee performed  stronger and nearly every category but was most remarkable in the endurance and the single-leg wrap where she performed the other side by nearly 100%    Physical exam of patient's left knee reveals benign-appearing incision.  No knee swelling.  Good straight leg raising effort without lag.  Range of motion 0 to 145 degrees.  Moderate crepitus in early flexion.  Past patella mobility 1+ quadrant lateral mobility with a firm endpoint    Examination of patient's right knee reveals no knee swelling.  Satisfactory tracking with a soft J sign present.  Past patella mobility 2 quadrants lateral mobility with a soft endpoint 1 quadrant medial mobility.  Negative medial patella tilt test.  No apprehension sign was present.    X-rays taken the December showed complete healing of the tibial tubercle osteotomy site and were not repeated today.    MRI was reviewed on her opposite right knee.  It does show positive lateral tilt.  There is significant heterogenicity of the patellar cartilage on both the axial and sagittal views without any mary chondral loss.    Assessment: 1.  Satisfactory stability with need for improved confidence in the use of the left knee.    2.  Continued pain right knee without mary instability episodes    Plan: We talked about the use of an nonsteroidals and although this has helped in the past she is reluctant to use them on any kind of regular basis.    We talked about the possible role of glucosamine and conjoint sulfate.  She has used this in the past and is open to using it again    I do not see any need to inject the right knee for pain.    I believe the best course of treatment for her pain is continued strengthening and good dietary habits so she can return to her previous baseline weight and improve her strength.    When asked about the future, if she continues to have pain that improves with Dozier tape technique with or without subluxation events 1 could consider patella  stabilization.    Because she wants to know who would be able to do this in my absence or after my [facility] I gave her 3 names.    Marcin Hewitt at [facility]  [address] at [facility]  Phillip Arriola at [place] [facility]    Talia Preciado MD  Professor Orthopedic Surgery  [facility]

## 2024-06-18 NOTE — PROGRESS NOTES
Lower Extremity Physical Performance Testing    Surgery/Injury: MPFL reconstruction, anteromedial tib-tub osteotomy, LRL, partial lateral facetectomy      Involved Extremity: left   Date of Surgery/Injury: 9/7/2023    Surgeon/MD: Dr. Preciado  Therapist performing test: True Vazquez     Primary Treating Therapist: TREMAINE [facility]    Patient subjective symptom/function report: Pt reports that left knee is feeling okay.  Strengthening has helped with some of the pain but she reports lingering swelling that doesn't seem to improve with ice or elevation.  Her knee can still get quite stiff with prolonged sitting/standing.  Her right knee is currently more bothersome than her left.  She went roller blading recently and the pain was extreme.  Walking is mildly bothered by both knees.  She gauges progress by tolerance to stairs which are still uncomfortable bilaterally.  She feels like her left is slowly getting better but right is slowly getting worse.    LSI% =Limb Symmetry Index (score comparison between involved/uninvolved extremity)    Anthropomorphic Measures      Range of Motion Jt. Line Circum. Measurement 15 cm Prox Circum. Measurement   Uninvolved 0-0-145 degrees 42 cm 57 cm   Involved 0-0-145 degrees 42 cm 53 cm   Difference  0 cm 4 cm     Hand Held Dynamometry      Involved Limb Uninvolved Limb   Quadriceps 26.9 kg 34.5 kg   LSI% 78%        Return to Function (Level I): Balance, Muscle Strength   Testing Protocol: Recorded values = best effort of 3 attempts (after 2 practice attempts).    Single Leg Stand and Reach Anterior/Medial Anterior/Lateral   Uninvolved Extremity 95 cm 82 cm   Involved Extremity 91 cm   80 cm   LSI% 96% 97 %     Single Leg Balance (EC) Max = 60 seconds   Uninvolved Extremity 60 seconds  *noticeably more challenging   Involved Extremity 60 seconds   LSI% 100%     Single Leg Squat    Uninvolved Extremity 78 degrees   Involved Extremity 84 degrees   LSI% 107%     Retro Step     Uninvolved Extremity 10 in.   Involved Extremity 10 in.     LSI% 100%       Return to Function (Level I): Core Stability   Perceived Exertion Ratin to 5 point scale, (0) Very Easy << >> (5) Maximal Exertion.  1 verbal cuing episode for alignment correction allowed before testing terminated.  Progress patient from basic to advanced versions of core poses as strength/endurance/control improves.    Prone Plank Hold: Pose: advanced X/60 Seconds   Hold Time 60/60 seconds   LSI% 100%     Side Plank Hold: Pose: advanced X/60 Seconds   Uninvolved Side Down 60/60 seconds   Involved Side Down 60/60 seconds   LSI% 100%     Single Leg Bridge Reps (Tempo 60 BPM) # of Reps to Failure   Uninvolved Extremity 18   Involved Extremity 27   LSI% 150%       Return to Fitness (Level II): Muscle Endurance, Power   Level II Functional Prerequisites:   1) All Level I tests ?85% of uninvolved side or maximal value, and  2) Bilateral squats ?90  knee flexion x 20 reps with good trunk, L/E alignment control.    Perceived Exertion Ratin to 5 point scale, (0) Very Easy << >> (5) Maximal Exertion.  2 verbal cuing episodes allowed for alignment correction before testing terminated.  Only reps completed with good alignment counted toward total reps.    Single Leg Squat Endurance (Reps to 60 KF, 60bpm x 2 minutes) X/60 Reps    *unable to perform Percent Return   Uninvolved Extremity -/60 reps -%   Involved Extremity -/60 reps -%   LSI% -%      Single Leg Hop    Uninvolved Extremity 1.04 M   Involved Extremity 0.88 M   LSI% 85%     Return to Sport (Level III): Power   Level III Functional Prerequisites:   1) All Level I tests ?85% of uninvolved side or maximal value, and  2) All Level II tests ?85% of uninvolved side.    6M Timed Hop    Uninvolved Extremity - seconds   Involved Extremity - seconds   LSI% -%     Single Leg Crossover Hop    Uninvolved Extremity - M   Involved Extremity - M   LSI% -%       Notes on QUALITY of body movement  patterns:  Pt demonstrates good dynamic control, activity is more limited by pain than anything.      Assessment/Plan:  Patient is 9 months s/p left MPFL reconstruction, anteromedial tib-tub osteotomy, LRL, partial lateral facetectomy.  Her recovery has been up and down due to other health matters.  She has recently become more consistent with physical therapy and strength training and has noticed this benefitting her left knee.  She has however noticed more right knee pain that seems to be getting worse.  Upon examination, she demonstrates good balance but impaired quad and glute strength as demonstrated during SL bridging and squat exercises.  She will continue to benefit from physical therapy to improve strength and activity tolerance overall.    Exercises Instructed per Test Findings:  1) Single leg bridges  2) Single leg squat  3) Gentle hopping/jump roping, progressing intensity         Assessment & Plan   CLINICAL IMPRESSIONS  Medical Diagnosis: S/P knee surgery (Z98.890)    Treatment Diagnosis: s/p left knee surgery   Impression/Assessment: Patient is a 28 year old female with left knee complaints.  The following significant findings have been identified: Pain, Decreased ROM/flexibility, Decreased strength, Impaired muscle performance, Decreased activity tolerance, and Instability. These impairments interfere with their ability to perform self care tasks, work tasks, recreational activities, household chores, and community mobility as compared to previous level of function.     Clinical Decision Making (Complexity):  Clinical Presentation: Stable/Uncomplicated  Clinical Presentation Rationale: based on medical and personal factors listed in PT evaluation  Clinical Decision Making (Complexity): Low complexity    PLAN OF CARE  Treatment Interventions:  Interventions: Manual Therapy, Neuromuscular Re-education, Therapeutic Activity, Therapeutic Exercise    Long Term Goals     PT Goal 3  Goal Identifier:  HEP  Goal Description: Pt will be able to perform HEP independently at home and apply appropriate therapeutic techniques to help manage symptoms going forward.  Goal Progress: HEP issued  Target Date: 06/18/24  Date Met: 09/15/24      Frequency of Treatment: 1x/week  Duration of Treatment: 1 week    Recommended Referrals to Other Professionals:     Education Assessment:   Learner/Method: Patient  Education Comments: HEP, POC    Risks and benefits of evaluation/treatment have been explained.   Patient/Family/caregiver agrees with Plan of Care.     Evaluation Time:     PT Eval, Low Complexity Minutes (22589): 10       Signing Clinician: True Vazquez PT        [facility]                                                                                   OUTPATIENT PHYSICAL THERAPY      PLAN OF TREATMENT FOR OUTPATIENT REHABILITATION   Patient's Last Name, First Name, TREMAINETiffanie Mcclain YOB: 1995   Provider's Name   [facility]   Medical Record No.  [id number]     Onset Date: 09/07/23  Start of Care Date: 06/18/24     Medical Diagnosis:  S/P knee surgery (Z98.890)      PT Treatment Diagnosis:  s/p left knee surgery Plan of Treatment  Frequency/Duration: 1x/week/ 1 week    Certification date from 06/18/24 to 09/15/24         See note for plan of treatment details and functional goals     True Vazquez, PT                         I CERTIFY THE NEED FOR THESE SERVICES FURNISHED UNDER        THIS PLAN OF TREATMENT AND WHILE UNDER MY CARE     (Physician attestation of this document indicates review and certification of the therapy plan).              Referring Provider:  Talia Preciado    Initial Assessment  See Epic Evaluation- Start of Care Date: 06/18/24

## 2024-06-18 NOTE — NURSING NOTE
Reason For Visit:   Chief Complaint   Patient presents with    RECHECK     DOS: 9/7/23 Left // Knee Arthroscopy, Medial Patello-femoral Ligament Reconstruction with Allograft, carlitos-medial plus distal Tibial Tubercle Osteotomy, lateral retinacular lengthening with partial lateral facetectomy       Primary MD: [place]  Ref. MD: EST     ?  No  Currently working? no  Work status?  medical leave.     Date of surgery: 9/7/23   Type of surgery: Left Knee Arthroscopy, Medial Patello-femoral Ligament Reconstruction with Allograft, carlitos-medial Tibial Tubercle Osteotomy, lateral retinacular lengthening with partial lateral facetectomy      Smoker: No  Request smoking cessation information: No    There were no vitals taken for this visit.    Pain Assessment  Patient Currently in Pain: Yes  0-10 Pain Scale: 2  Primary Pain Location: Knee (left)  Pain Descriptors: Other (comment) (clicking)  Aggravating Factors: Stairs

## 2024-06-18 NOTE — PROGRESS NOTES
Patient patient is a 28-year-old female who is now nearly 9 months status post a left knee MPFL reconstruction, distal tip tube osteotomy, LRL with partial lateral facetectomy.    Postop she had significant anxiety and initial pain that limited advancement in physical therapy.     She then had she also was pregnant in her first trimester.  She gained about 35 pounds.  She subsequently miscarried and had a D&C.  In addition to the emotional distress she is trying to lose the added weight.  Her baseline weight in her mind is 175 285.  She weighed 210 at her top weight and she now is about 200.     She admits that during this time she did not pay as much attention to her strengthening as she should.  She has been seeing Riri at physical therapy all along but admits that she needs a structure and is not very motivated to do it on her own.    In regards to her operated left leg she feels that there is some clicking and noise but otherwise she feels that it is a stable knee.  She is having increasing pain on her opposite right knee.  This is what we spent most of the time talking about.    She has not had a recent dislocation but feels that her right knee has been subject to several subluxation events.  However it is the pain that bothers her the most.  She also believes that this knee swells.    She is worried about the future in regards to her right knee.  She has tried a tape technique with Lesly and it does offer her some help in regards to this pain.  She also has occasionally take anti-inflammatory with some positive effect but she does not like to do this on a daily basis.    A functional test was done previsit.  It reveals that her operative left knee performed stronger and nearly every category but was most remarkable in the endurance and the single-leg wrap where she performed the other side by nearly 100%    Physical exam of patient's left knee reveals benign-appearing incision.  No knee swelling.  Good straight  leg raising effort without lag.  Range of motion 0 to 145 degrees.  Moderate crepitus in early flexion.  Past patella mobility 1+ quadrant lateral mobility with a firm endpoint    Examination of patient's right knee reveals no knee swelling.  Satisfactory tracking with a soft J sign present.  Past patella mobility 2 quadrants lateral mobility with a soft endpoint 1 quadrant medial mobility.  Negative medial patella tilt test.  No apprehension sign was present.    X-rays taken the December showed complete healing of the tibial tubercle osteotomy site and were not repeated today.    MRI was reviewed on her opposite right knee.  It does show positive lateral tilt.  There is significant heterogenicity of the patellar cartilage on both the axial and sagittal views without any mary chondral loss.    Assessment: 1.  Satisfactory stability with need for improved confidence in the use of the left knee.    2.  Continued pain right knee without mary instability episodes    Plan: We talked about the use of an nonsteroidals and although this has helped in the past she is reluctant to use them on any kind of regular basis.    We talked about the possible role of glucosamine and conjoint sulfate.  She has used this in the past and is open to using it again    I do not see any need to inject the right knee for pain.    I believe the best course of treatment for her pain is continued strengthening and good dietary habits so she can return to her previous baseline weight and improve her strength.    When asked about the future, if she continues to have pain that improves with Dozier tape technique with or without subluxation events 1 could consider patella stabilization.    Because she wants to know who would be able to do this in my absence or after my senior care I gave her 3 names.    Marcin Hewitt at [facility]  [address] at [facility]3]  Phillip Arriola at [facility]    MD Domonique Jo  Orthopedic Surgery  [facility]

## 2024-06-21 ENCOUNTER — THERAPY VISIT (OUTPATIENT)
Dept: PHYSICAL THERAPY | Facility: CLINIC | Age: 29
End: 2024-06-21
Payer: COMMERCIAL

## 2024-06-21 DIAGNOSIS — Z98.890 S/P LIGAMENT REPAIR: Primary | ICD-10-CM

## 2024-06-21 PROCEDURE — 97530 THERAPEUTIC ACTIVITIES: CPT | Mod: GP | Performed by: PHYSICAL THERAPIST

## 2024-06-21 PROCEDURE — 97110 THERAPEUTIC EXERCISES: CPT | Mod: GP | Performed by: PHYSICAL THERAPIST

## 2024-07-08 ENCOUNTER — THERAPY VISIT (OUTPATIENT)
Dept: PHYSICAL THERAPY | Facility: CLINIC | Age: 29
End: 2024-07-08
Payer: COMMERCIAL

## 2024-07-08 DIAGNOSIS — Z98.890 S/P LIGAMENT REPAIR: Primary | ICD-10-CM

## 2024-07-08 PROCEDURE — 97112 NEUROMUSCULAR REEDUCATION: CPT | Mod: GP | Performed by: PHYSICAL THERAPIST

## 2024-07-08 PROCEDURE — 97110 THERAPEUTIC EXERCISES: CPT | Mod: GP | Performed by: PHYSICAL THERAPIST

## 2024-08-05 ENCOUNTER — THERAPY VISIT (OUTPATIENT)
Dept: PHYSICAL THERAPY | Facility: CLINIC | Age: 29
End: 2024-08-05
Payer: COMMERCIAL

## 2024-08-05 DIAGNOSIS — Z98.890 S/P LIGAMENT REPAIR: Primary | ICD-10-CM

## 2024-08-05 PROCEDURE — 97110 THERAPEUTIC EXERCISES: CPT | Mod: GP | Performed by: PHYSICAL THERAPIST

## 2024-08-05 ASSESSMENT — ACTIVITIES OF DAILY LIVING (ADL)
STIFFNESS: THE SYMPTOM AFFECTS MY ACTIVITY SLIGHTLY
PAIN: THE SYMPTOM AFFECTS MY ACTIVITY SLIGHTLY
RISE FROM A CHAIR: ACTIVITY IS NOT DIFFICULT
WEAKNESS: THE SYMPTOM AFFECTS MY ACTIVITY SLIGHTLY
GO DOWN STAIRS: ACTIVITY IS MINIMALLY DIFFICULT
HOW_WOULD_YOU_RATE_THE_OVERALL_FUNCTION_OF_YOUR_KNEE_DURING_YOUR_USUAL_DAILY_ACTIVITIES?: NEARLY NORMAL
SIT WITH YOUR KNEE BENT: ACTIVITY IS MINIMALLY DIFFICULT
AS_A_RESULT_OF_YOUR_KNEE_INJURY,_HOW_WOULD_YOU_RATE_YOUR_CURRENT_LEVEL_OF_DAILY_ACTIVITY?: NEARLY NORMAL
RAW_SCORE: 52
KNEE_ACTIVITY_OF_DAILY_LIVING_SCORE: 74.29
WALK: ACTIVITY IS MINIMALLY DIFFICULT
SQUAT: ACTIVITY IS MINIMALLY DIFFICULT
STAND: ACTIVITY IS MINIMALLY DIFFICULT
KNEE_ACTIVITY_OF_DAILY_LIVING_SUM: 52
GO UP STAIRS: ACTIVITY IS MINIMALLY DIFFICULT
LIMPING: I DO NOT HAVE THE SYMPTOM
SWELLING: THE SYMPTOM AFFECTS MY ACTIVITY SLIGHTLY
HOW_WOULD_YOU_RATE_THE_CURRENT_FUNCTION_OF_YOUR_KNEE_DURING_YOUR_USUAL_DAILY_ACTIVITIES_ON_A_SCALE_FROM_0_TO_100_WITH_100_BEING_YOUR_LEVEL_OF_KNEE_FUNCTION_PRIOR_TO_YOUR_INJURY_AND_0_BEING_THE_INABILITY_TO_PERFORM_ANY_OF_YOUR_USUAL_DAILY_ACTIVITIES?: 75
GIVING WAY, BUCKLING OR SHIFTING OF KNEE: I HAVE THE SYMPTOM BUT IT DOES NOT AFFECT MY ACTIVITY
KNEEL ON THE FRONT OF YOUR KNEE: ACTIVITY IS FAIRLY DIFFICULT

## 2024-09-17 ENCOUNTER — THERAPY VISIT (OUTPATIENT)
Dept: PHYSICAL THERAPY | Facility: CLINIC | Age: 29
End: 2024-09-17
Payer: COMMERCIAL

## 2024-09-17 DIAGNOSIS — Z98.890 S/P LIGAMENT REPAIR: Primary | ICD-10-CM

## 2024-09-17 PROCEDURE — 97110 THERAPEUTIC EXERCISES: CPT | Mod: GP | Performed by: PHYSICAL THERAPIST

## 2024-09-17 NOTE — PROGRESS NOTES
Today was patient's discharge from PT. Her surgical side quad strength is 93% compared to her non surgical. She knows what she needs to continue to work on (use Zachary Prell taping as needed, and continue her workouts and strengthening at the gym). She may consult with Dr. Preciado again about potential surgical options for her right knee.        09/17/24 0500   Appointment Info   Signing clinician's name / credentials Kenia Lopez, PT, SCS   Total/Authorized Visits [employer]   Visits Used 56   Medical Diagnosis L knee post op: MPFL reconstruction, Tibial Tub osteotomy   PT Tx Diagnosis L knee decreased ROM, and strength   Other pertinent information    Progress Note/Certification   Start of Care Date 09/14/23   Onset of illness/injury or Date of Surgery 09/07/23   Therapy Frequency 1X/week   Predicted Duration 12 weeks   Certification date from 08/13/24   Certification date to 11/05/24   Progress Note Due Date 11/05/24   Progress Note Completed Date 09/17/24   GOALS   PT Goals 2   PT Goal 1   Goal Identifier Goal 1   Goal Description Pt will be able to go up and down stairs, without a railing, painfree   Rationale to maximize safety and independence within the home   Goal Progress improving, still some slight discomort   Target Date 11/05/24   PT Goal 2   Goal Identifier Goal 2   Goal Description Pt wll be able to walk, without AD, household distance, with no pain   Rationale to maximize safety and independence with performance of ADLs and functional tasks;to maximize safety and independence within the home   Goal Progress attained   Target Date 02/27/24   Date Met 03/05/24   PT Goal 3   Goal Identifier HEP   Goal Description Pt will be able to perform HEP independently at home and apply appropriate therapeutic techniques to help manage symptoms going forward.   Goal Progress HEP issued   Target Date 06/18/24   Date Met 09/15/24   Subjective Report   Subjective Report Pt notes she has been back into her workout routines at  the gym (but her knees swell and are more painful from working out). Her workouts make her back pain feel better but her knee pain worse. Standing and being on her feet for porlonged periods of time still can be very bothersome to her knees. Left knee 80%; Right knee 75%.   Objective Measure 1   Objective Measure AROM   Details R: 2*-0-145; L; 2-0-145   Objective Measure 2   Objective Measure Swellilng Mid Patella   Details mid patella R=41cm; L=41.5cm   Objective Measure 3   Objective Measure Quad girth   Details 5cm: L 46cm; R 47.5cm   Objective Measure 4   Objective Measure Quad isometric strength testing: tindeq (at 70deg)   Details L= 127.9 lbs  Y=258vng   93% LSI   Treatment Interventions (PT)   Interventions Therapeutic Procedure/Exercise   Therapeutic Procedure/Exercise   Therapeutic Procedures Ther Proc 2;Ther Proc 3;Ther Proc 4;Ther Proc 5;Ther Proc 6;Ther Proc 7   Ther Proc 1 Bike seat 11   Ther Proc 1 - Details 5 min   Ther Proc 2 Leg press (seat 4)- trial w/ PF brace on R   Ther Proc 2 - Details NT   Ther Proc 3 Education   Ther Proc 3 - Details on getting back to the gym routine   Ther Proc 4 side stepping   Ther Proc 4 - Details rev   Ther Proc 5 wall sits (30-60-80)   Ther Proc 5 - Details rev   Ther Proc 6 SLR sitting up   Ther Proc 6 - Details rev   Ther Proc 7 education   Ther Proc 7 - Details on gym routine/workouts   Skilled Intervention instructed on exercsies to help reduce pain and strengthening   Patient Response/Progress Pt tolerated well   Neuromuscular Re-education   Neuromuscular Re-education Neuro Re-ed 2;Neuro Re-ed 3   Neuro Re-ed 1 blaze pods   Neuro Re-ed 1 - Details NT   Neuro Re-ed 2 SLS on bosu (dome up)   Neuro Re-ed 2 - Details X30 sec X2 sets each   Neuro Re-ed 3 SLS EC on foam   Neuro Re-ed 3 - Details X30 sec each X 2 sets   Neuro Re-ed 8 SL squat and reach cones on blue foam   Neuro Re-ed 8 - Details X10 each side   Skilled Intervention instructed on challenging balance to  improve knee stability   Education   Learner/Method Patient;Pictures/Video   Education Comments education on post op protocol, on restrictions   Plan   Home program See PTRx (phone)   Plan for next session continue to strengthen with BFR-  performance testing this month (March)       [facility]                                                                                   OUTPATIENT PHYSICAL THERAPY    PLAN OF TREATMENT FOR OUTPATIENT REHABILITATION   Patient's Last Name, First Name, Tiffanie Gillis YOB: 1995   Provider's Name   [facility]   Medical Record No.  [id number]     Onset Date: 09/07/23  Start of Care Date: 09/14/23     Medical Diagnosis:  L knee post op: MPFL reconstruction, Tibial Tub osteotomy      PT Treatment Diagnosis:  L knee decreased ROM, and strength Plan of Treatment  Frequency/Duration: 1X/week/ 12 weeks    Certification date from 08/13/24 to (P) 11/05/24         See note for plan of treatment details and functional goals     Kenia Lopez, PT                         I CERTIFY THE NEED FOR THESE SERVICES FURNISHED UNDER        THIS PLAN OF TREATMENT AND WHILE UNDER MY CARE     (Physician attestation of this document indicates review and certification of the therapy plan).              Referring Provider:  Talia Preciado    Initial Assessment  See Epic Evaluation- Start of Care Date: 09/14/23

## (undated) DEVICE — SU MONOCRYL 3-0 PS-1 27" Y936H

## (undated) DEVICE — PACK ARTHROSCOPY CUSTOM ASC

## (undated) DEVICE — GLOVE BIOGEL PI MICRO SZ 6.5 48565

## (undated) DEVICE — TUBING SYSTEM ARTHREX PATIENT REDEUCE AR-6421

## (undated) DEVICE — LINEN GOWN XLG 5407

## (undated) DEVICE — DRAPE C-ARM W/STRAPS 42X72" 07-CA104

## (undated) DEVICE — ESU PENCIL SMOKE EVAC W/ROCKER SWITCH 0703-047-000

## (undated) DEVICE — GLOVE GAMMEX NEOPRENE ULTRA SZ 6.5 LF 8513

## (undated) DEVICE — ESU GROUND PAD ADULT W/CORD E7507

## (undated) DEVICE — DRSG STERI STRIP 1/2X4" R1547

## (undated) DEVICE — DRSG TEGADERM 2 3/8X2 3/4" 1624W

## (undated) DEVICE — LINEN ORTHO PACK 5446

## (undated) DEVICE — SU VICRYL 1 CT-2 27" J335H

## (undated) DEVICE — SUCTION MANIFOLD NEPTUNE 2 SYS 4 PORT 0702-020-000

## (undated) DEVICE — SU NDL MCGOWAN 1/2 1859-6D

## (undated) DEVICE — DRSG TEGADERM 4X4 3/4" 1626W

## (undated) DEVICE — BNDG ESMARK 6" STERILE LF 820-3612

## (undated) DEVICE — DRSG TEGADERM ALGINATE AG 4X5" 90303

## (undated) DEVICE — DRAPE MAYO STAND 23X54 8337

## (undated) DEVICE — SU VICRYL 2-0 CT-2 27" UND J269H

## (undated) DEVICE — PACK ACL SUPPLEMENT CUSTOM ASC

## (undated) DEVICE — SOL NACL 0.9% IRRIG 500ML BOTTLE 2F7123

## (undated) DEVICE — Device

## (undated) DEVICE — SU ETHIBOND 1 CT-1 30" X425H

## (undated) DEVICE — SOL NACL 0.9% IRRIG 3000ML BAG 2B7477

## (undated) RX ORDER — DIAZEPAM 5 MG
TABLET ORAL
Status: DISPENSED
Start: 2023-09-07

## (undated) RX ORDER — ACETAMINOPHEN 325 MG/1
TABLET ORAL
Status: DISPENSED
Start: 2023-09-07

## (undated) RX ORDER — FENTANYL CITRATE 50 UG/ML
INJECTION, SOLUTION INTRAMUSCULAR; INTRAVENOUS
Status: DISPENSED
Start: 2023-09-07

## (undated) RX ORDER — BUPIVACAINE HYDROCHLORIDE 2.5 MG/ML
INJECTION, SOLUTION EPIDURAL; INFILTRATION; INTRACAUDAL
Status: DISPENSED
Start: 2023-09-07

## (undated) RX ORDER — HYDROMORPHONE HYDROCHLORIDE 1 MG/ML
INJECTION, SOLUTION INTRAMUSCULAR; INTRAVENOUS; SUBCUTANEOUS
Status: DISPENSED
Start: 2023-09-07

## (undated) RX ORDER — ONDANSETRON 4 MG/1
TABLET, ORALLY DISINTEGRATING ORAL
Status: DISPENSED
Start: 2023-09-07

## (undated) RX ORDER — CEFAZOLIN SODIUM 2 G/50ML
SOLUTION INTRAVENOUS
Status: DISPENSED
Start: 2023-09-07

## (undated) RX ORDER — APREPITANT 40 MG/1
CAPSULE ORAL
Status: DISPENSED
Start: 2023-09-07

## (undated) RX ORDER — OXYCODONE HYDROCHLORIDE 5 MG/1
TABLET ORAL
Status: DISPENSED
Start: 2023-09-07

## (undated) RX ORDER — TRANEXAMIC ACID 100 MG/ML
INJECTION, SOLUTION INTRAVENOUS
Status: DISPENSED
Start: 2023-09-07